# Patient Record
Sex: MALE | Race: WHITE | NOT HISPANIC OR LATINO | Employment: OTHER | URBAN - METROPOLITAN AREA
[De-identification: names, ages, dates, MRNs, and addresses within clinical notes are randomized per-mention and may not be internally consistent; named-entity substitution may affect disease eponyms.]

---

## 2023-03-30 DIAGNOSIS — E11.9 TYPE 2 DIABETES MELLITUS WITHOUT COMPLICATIONS (MULTI): ICD-10-CM

## 2023-04-12 RX ORDER — METFORMIN HYDROCHLORIDE 500 MG/1
TABLET ORAL
Qty: 180 TABLET | Refills: 1 | Status: SHIPPED | OUTPATIENT
Start: 2023-04-12 | End: 2023-05-18 | Stop reason: DRUGHIGH

## 2023-05-11 PROBLEM — G89.29 CHRONIC LEFT SHOULDER PAIN: Status: ACTIVE | Noted: 2023-05-11

## 2023-05-11 PROBLEM — M54.30 SCIATICA: Status: ACTIVE | Noted: 2023-05-11

## 2023-05-11 PROBLEM — M25.512 CHRONIC LEFT SHOULDER PAIN: Status: ACTIVE | Noted: 2023-05-11

## 2023-05-11 PROBLEM — G89.4 CHRONIC PAIN SYNDROME: Status: ACTIVE | Noted: 2023-05-11

## 2023-05-11 PROBLEM — E78.5 HYPERLIPIDEMIA: Status: ACTIVE | Noted: 2023-05-11

## 2023-05-11 PROBLEM — N40.0 BENIGN PROSTATIC HYPERPLASIA: Status: ACTIVE | Noted: 2023-05-11

## 2023-05-11 PROBLEM — I10 HYPERTENSION: Status: ACTIVE | Noted: 2023-05-11

## 2023-05-11 PROBLEM — D17.30 LIPOMA OF SKIN AND SUBCUTANEOUS TISSUE: Status: ACTIVE | Noted: 2023-05-11

## 2023-05-11 PROBLEM — S43.61XA STERNOCLAVICULAR SPRAIN, RIGHT, INITIAL ENCOUNTER: Status: ACTIVE | Noted: 2023-05-11

## 2023-05-11 PROBLEM — M51.26 DISC DISPLACEMENT, LUMBAR: Status: ACTIVE | Noted: 2023-05-11

## 2023-05-11 PROBLEM — E11.9 DIABETES MELLITUS (MULTI): Status: ACTIVE | Noted: 2023-05-11

## 2023-05-11 PROBLEM — G89.29 CHRONIC LEFT-SIDED LOW BACK PAIN WITH LEFT-SIDED SCIATICA: Status: ACTIVE | Noted: 2023-05-11

## 2023-05-11 PROBLEM — G54.2 LESION OF LEFT CERVICAL NERVE ROOT: Status: ACTIVE | Noted: 2023-05-11

## 2023-05-11 PROBLEM — E55.9 VITAMIN D DEFICIENCY DISEASE: Status: ACTIVE | Noted: 2023-05-11

## 2023-05-11 PROBLEM — H69.92 DYSFUNCTION OF LEFT EUSTACHIAN TUBE: Status: ACTIVE | Noted: 2023-05-11

## 2023-05-11 PROBLEM — M54.42 CHRONIC LEFT-SIDED LOW BACK PAIN WITH LEFT-SIDED SCIATICA: Status: ACTIVE | Noted: 2023-05-11

## 2023-05-11 PROBLEM — J30.1 HAY FEVER: Status: ACTIVE | Noted: 2023-05-11

## 2023-05-11 PROBLEM — M54.2 CERVICALGIA: Status: ACTIVE | Noted: 2023-05-11

## 2023-05-11 PROBLEM — M77.10 LATERAL EPICONDYLITIS: Status: ACTIVE | Noted: 2023-05-11

## 2023-05-11 PROBLEM — Z98.890 STATUS POST LUMBAR SPINE SURGERY FOR DECOMPRESSION OF SPINAL CORD: Status: ACTIVE | Noted: 2023-05-11

## 2023-05-11 PROBLEM — J34.2 DEVIATED NASAL SEPTUM: Status: ACTIVE | Noted: 2023-05-11

## 2023-05-11 PROBLEM — M54.12 CERVICAL RADICULOPATHY: Status: ACTIVE | Noted: 2023-05-11

## 2023-05-11 PROBLEM — I80.9 PHLEBITIS, SUPERFICIAL: Status: ACTIVE | Noted: 2023-05-11

## 2023-05-11 PROBLEM — J30.9 ALLERGIC RHINITIS: Status: ACTIVE | Noted: 2023-05-11

## 2023-05-11 RX ORDER — SIMVASTATIN 10 MG/1
10 TABLET, FILM COATED ORAL DAILY
COMMUNITY
End: 2023-06-13 | Stop reason: SDUPTHER

## 2023-05-11 RX ORDER — OMEPRAZOLE 10 MG/1
CAPSULE, DELAYED RELEASE ORAL
COMMUNITY

## 2023-05-11 RX ORDER — GABAPENTIN 100 MG/1
CAPSULE ORAL
COMMUNITY
Start: 2022-11-15 | End: 2024-01-22

## 2023-05-11 RX ORDER — TADALAFIL 20 MG/1
TABLET ORAL
COMMUNITY
Start: 2023-01-18

## 2023-05-11 RX ORDER — GABAPENTIN 300 MG/1
300 CAPSULE ORAL 4 TIMES DAILY
COMMUNITY
End: 2023-07-18 | Stop reason: SDUPTHER

## 2023-05-11 RX ORDER — BLOOD SUGAR DIAGNOSTIC
STRIP MISCELLANEOUS
COMMUNITY
Start: 2013-09-25

## 2023-05-11 RX ORDER — DULOXETIN HYDROCHLORIDE 60 MG/1
60 CAPSULE, DELAYED RELEASE ORAL DAILY
COMMUNITY

## 2023-05-11 RX ORDER — ASPIRIN 81 MG/1
81 TABLET ORAL
COMMUNITY
End: 2024-05-28 | Stop reason: ALTCHOICE

## 2023-05-11 RX ORDER — NALOXONE HYDROCHLORIDE 4 MG/.1ML
SPRAY NASAL
COMMUNITY
Start: 2021-12-21 | End: 2024-03-06 | Stop reason: ALTCHOICE

## 2023-05-11 RX ORDER — ACETAMINOPHEN 500 MG
TABLET ORAL DAILY
COMMUNITY
End: 2023-05-18 | Stop reason: ALTCHOICE

## 2023-05-18 ENCOUNTER — OFFICE VISIT (OUTPATIENT)
Dept: PRIMARY CARE | Facility: CLINIC | Age: 68
End: 2023-05-18
Payer: COMMERCIAL

## 2023-05-18 VITALS
BODY MASS INDEX: 30.23 KG/M2 | SYSTOLIC BLOOD PRESSURE: 126 MMHG | DIASTOLIC BLOOD PRESSURE: 86 MMHG | OXYGEN SATURATION: 97 % | HEART RATE: 86 BPM | WEIGHT: 193 LBS

## 2023-05-18 DIAGNOSIS — I10 PRIMARY HYPERTENSION: Primary | ICD-10-CM

## 2023-05-18 DIAGNOSIS — Z12.5 SCREENING FOR MALIGNANT NEOPLASM OF PROSTATE: ICD-10-CM

## 2023-05-18 DIAGNOSIS — E08.00 DIABETES MELLITUS DUE TO UNDERLYING CONDITION WITH HYPEROSMOLARITY WITHOUT COMA, WITHOUT LONG-TERM CURRENT USE OF INSULIN (MULTI): ICD-10-CM

## 2023-05-18 LAB — POC HEMOGLOBIN A1C: 6.1 % (ref 4.2–6.5)

## 2023-05-18 PROCEDURE — 1159F MED LIST DOCD IN RCRD: CPT | Performed by: FAMILY MEDICINE

## 2023-05-18 PROCEDURE — 3079F DIAST BP 80-89 MM HG: CPT | Performed by: FAMILY MEDICINE

## 2023-05-18 PROCEDURE — 1125F AMNT PAIN NOTED PAIN PRSNT: CPT | Performed by: FAMILY MEDICINE

## 2023-05-18 PROCEDURE — 3074F SYST BP LT 130 MM HG: CPT | Performed by: FAMILY MEDICINE

## 2023-05-18 PROCEDURE — 83036 HEMOGLOBIN GLYCOSYLATED A1C: CPT | Performed by: FAMILY MEDICINE

## 2023-05-18 PROCEDURE — 99213 OFFICE O/P EST LOW 20 MIN: CPT | Performed by: FAMILY MEDICINE

## 2023-05-18 PROCEDURE — 1036F TOBACCO NON-USER: CPT | Performed by: FAMILY MEDICINE

## 2023-05-18 PROCEDURE — 3044F HG A1C LEVEL LT 7.0%: CPT | Performed by: FAMILY MEDICINE

## 2023-05-18 ASSESSMENT — PATIENT HEALTH QUESTIONNAIRE - PHQ9
2. FEELING DOWN, DEPRESSED OR HOPELESS: NOT AT ALL
1. LITTLE INTEREST OR PLEASURE IN DOING THINGS: NOT AT ALL
SUM OF ALL RESPONSES TO PHQ9 QUESTIONS 1 AND 2: 0

## 2023-05-18 NOTE — PATIENT INSTRUCTIONS
A1c 6.1% today with looks great   Continue the metformin 1000 mg twice per day with meals.  Continue the new dietary changes.     Colon screening in August 2, 2023. we will look for the results.     Labs due in the November 2023, order given today .     follow up with urologist as recommended     Continue the cholesterol medications.     Blood pressure looks good. 126/86    call if you need refills.     follow up in November 2023 (to review labs and recheck A1c) or sooner as needed.

## 2023-05-18 NOTE — PROGRESS NOTES
Subjective   Casey Raya is a 68 y.o. male who presents for Follow-up (Follow up on A1c).    HPI  Casey is a pleasant 68 yr here for follow up   Accompanied by wife.     1. Chronic neck and Back Pain- would like a MRI on 3/31/22- no major changes to the lumbar spine, still have disease and would recommend seeing an orthopedic spine doc to review and discuss with your sympotms. L4/L5: Left hemilaminotomy (post surgery changes) . Decreased left paracentral protrusion and perineural fibrosis (this is a good thing) . L5/S1: Moderate left foraminal stenosis.  - tried an epidural nerve block which didn’t help   - previous s/p L4/L5 back surgery performed by Vignesh Kevin  - [facility] rec trial of PT in April 2022  - increased gabapentin by 200 mg (1400 per day)     #) trigger finger, - release by Dr Newton in 2022    2. 3 mo A1C follow up-- 6.3% today (up from 6.2%) --> 7.4% --> 6.1%   - on metformin 500 mg BID --> will increase to 100 mg BID   - was on statin, and stopped because was worried it was making sugars are worse     2. Pain Management  - not following with them any longer   - Gabapentin - is helping, cymbalta  - wants some Vicodin for his back until he can see Dr. Kevin    3. Shoulder Pain- the same  - Dr. High had an appointment and he said that there is nothing wrong with the shoulder structurally according to the patient    4. BPH with LUTS - follows with urology  - on gemtesa (stopped the myrbetric)   resolved s/p Urolift 2019 and PVP 11/08/2021      ROS was completed and all systems are negative with the exception of what was noted in the the HPI.     Objective     /86   Pulse 86   Wt 87.5 kg (193 lb)   SpO2 97%   BMI 30.23 kg/m²      Physical Exam  Gen: A+O, NAD  HEENT: NC/AT, EOMI, no LAD, oropharynx clear, no edema or erythema, TM visualized and normal  CV: RRR, No M/R/G  Resp: CTAB, No W/R/C  Abd: Soft, NT/ND  Neuro: PERRL, moves all extremities equally, normal gait   Skin: No  rashes, no LE edema or varicosities   MSK: Grossly intact strength and reflexes; normal gait  Psych: Normal mood and affect    Assessment/Plan   Problem List Items Addressed This Visit    None    Labs due in the Fall 2023    A1c was higher today at 7.1% , please increase the metformin to 1000 mg twice per day.     follow up with urologist.     restart the cholesterol medications.     For the constipation, try miralax or colace over the counter,   Also make sure to get enough fiber in your diet (this is food for the good bacteria). you could also try metamucil or other fiber supplement.   Shoot for roughly 35 grams of fiber per day.   Things high in fiber: fruits with skin on, nuts, seeds, berries, oatmeal, quinoa, and high fiber breads and cereals.    Blood pressure looks good. 130/78    call if you need refills.     follow up in 6 months or sooner as needed.        Ruthie Renee DO, MSMed, ABOM  [address]   [address]   [place] [address]  Ph. [phone]  Fx. [phone]

## 2023-06-13 ENCOUNTER — TELEPHONE (OUTPATIENT)
Dept: PRIMARY CARE | Facility: CLINIC | Age: 68
End: 2023-06-13
Payer: COMMERCIAL

## 2023-06-13 DIAGNOSIS — E78.5 HYPERLIPIDEMIA, UNSPECIFIED HYPERLIPIDEMIA TYPE: ICD-10-CM

## 2023-06-13 RX ORDER — SIMVASTATIN 10 MG/1
10 TABLET, FILM COATED ORAL DAILY
Qty: 90 TABLET | Refills: 3 | Status: SHIPPED | OUTPATIENT
Start: 2023-06-13 | End: 2024-04-03

## 2023-06-13 NOTE — TELEPHONE ENCOUNTER
[facility] called for a medication refill request for  Simvastatin  To go to the [facility] in [place].     LOV-05/18/23 NOV-11/02/23

## 2023-07-18 DIAGNOSIS — G89.4 CHRONIC PAIN SYNDROME: ICD-10-CM

## 2023-07-18 RX ORDER — GABAPENTIN 300 MG/1
300 CAPSULE ORAL 4 TIMES DAILY
Qty: 360 CAPSULE | Refills: 1 | Status: CANCELLED | OUTPATIENT
Start: 2023-07-18

## 2023-07-19 RX ORDER — GABAPENTIN 300 MG/1
300 CAPSULE ORAL 4 TIMES DAILY
Qty: 360 CAPSULE | Refills: 1 | Status: SHIPPED | OUTPATIENT
Start: 2023-07-19 | End: 2024-01-19

## 2023-08-23 LAB — HEMOGLOBIN A1C/HEMOGLOBIN TOTAL IN BLOOD EXTERNAL: 6.1 %

## 2023-11-01 ASSESSMENT — ENCOUNTER SYMPTOMS
FATIGUE: 0
POLYPHAGIA: 0
DIZZINESS: 0
BLURRED VISION: 0
HUNGER: 0
SPEECH DIFFICULTY: 0
HEADACHES: 0
NERVOUS/ANXIOUS: 0
TREMORS: 0
POLYDIPSIA: 0
SEIZURES: 0
BLACKOUTS: 0
WEIGHT LOSS: 0
VISUAL CHANGE: 0
WEAKNESS: 0
CONFUSION: 0
SWEATS: 0

## 2023-11-02 ENCOUNTER — OFFICE VISIT (OUTPATIENT)
Dept: PRIMARY CARE | Facility: CLINIC | Age: 68
End: 2023-11-02
Payer: COMMERCIAL

## 2023-11-02 VITALS
HEIGHT: 67 IN | RESPIRATION RATE: 18 BRPM | WEIGHT: 196 LBS | BODY MASS INDEX: 30.76 KG/M2 | DIASTOLIC BLOOD PRESSURE: 72 MMHG | SYSTOLIC BLOOD PRESSURE: 124 MMHG | OXYGEN SATURATION: 95 % | HEART RATE: 73 BPM

## 2023-11-02 DIAGNOSIS — E55.9 AVITAMINOSIS D: Primary | ICD-10-CM

## 2023-11-02 DIAGNOSIS — E08.00 DIABETES MELLITUS DUE TO UNDERLYING CONDITION WITH HYPEROSMOLARITY WITHOUT COMA, WITHOUT LONG-TERM CURRENT USE OF INSULIN (MULTI): ICD-10-CM

## 2023-11-02 DIAGNOSIS — Z12.5 SCREENING FOR MALIGNANT NEOPLASM OF PROSTATE: ICD-10-CM

## 2023-11-02 LAB — POC HEMOGLOBIN A1C: 7.2 % (ref 4.2–6.5)

## 2023-11-02 PROCEDURE — 3074F SYST BP LT 130 MM HG: CPT | Performed by: FAMILY MEDICINE

## 2023-11-02 PROCEDURE — 83036 HEMOGLOBIN GLYCOSYLATED A1C: CPT | Performed by: FAMILY MEDICINE

## 2023-11-02 PROCEDURE — 3051F HG A1C>EQUAL 7.0%<8.0%: CPT | Performed by: FAMILY MEDICINE

## 2023-11-02 PROCEDURE — 99214 OFFICE O/P EST MOD 30 MIN: CPT | Performed by: FAMILY MEDICINE

## 2023-11-02 PROCEDURE — 3078F DIAST BP <80 MM HG: CPT | Performed by: FAMILY MEDICINE

## 2023-11-02 PROCEDURE — 1125F AMNT PAIN NOTED PAIN PRSNT: CPT | Performed by: FAMILY MEDICINE

## 2023-11-02 PROCEDURE — 1036F TOBACCO NON-USER: CPT | Performed by: FAMILY MEDICINE

## 2023-11-02 PROCEDURE — 1159F MED LIST DOCD IN RCRD: CPT | Performed by: FAMILY MEDICINE

## 2023-11-02 PROCEDURE — 3044F HG A1C LEVEL LT 7.0%: CPT | Performed by: FAMILY MEDICINE

## 2023-11-02 RX ORDER — DAPAGLIFLOZIN 10 MG/1
10 TABLET, FILM COATED ORAL DAILY
Qty: 90 TABLET | Refills: 3 | Status: SHIPPED | OUTPATIENT
Start: 2023-11-02 | End: 2024-03-06 | Stop reason: ALTCHOICE

## 2023-11-02 RX ORDER — LORAZEPAM 0.5 MG/1
TABLET ORAL
COMMUNITY

## 2023-11-02 ASSESSMENT — ENCOUNTER SYMPTOMS
WEIGHT LOSS: 0
VISUAL CHANGE: 0
WEAKNESS: 0
HEADACHES: 0
BLURRED VISION: 0
DIZZINESS: 0
NERVOUS/ANXIOUS: 0
POLYPHAGIA: 0
SEIZURES: 0
SWEATS: 0
BLACKOUTS: 0
FATIGUE: 0
CONFUSION: 0
POLYDIPSIA: 0
SPEECH DIFFICULTY: 0
HUNGER: 0
TREMORS: 0

## 2023-11-02 NOTE — PATIENT INSTRUCTIONS
A1c was a little high today on 7.2%.   Continue the metformin 1000 mg twice per day with meals.    Please start the farxiga once daily for better sugar control and to protect the heart     Colon screening in August 2, 2023. Repeat colonoscopy in 10 years for screening purposes.     Labs due in the November 2023, order given today .     follow up with urologist as recommended     Continue the cholesterol medications.     Blood pressure looks good. 124/72    Please get an up dated eye and dental examination     Check your feet daily.     call if you need refills.     follow up in 90 days for a repeat A1c check or sooner as needed.

## 2023-11-02 NOTE — PROGRESS NOTES
"Subjective   Casey Raya is a 68 y.o. male who presents for Follow-up (Patient in today for routine F/U and A1C check. Reports no other concerns at this time. Stated his A1C may be a \"little elevated\" has not been able to walk for exercise as normal due to increased neck discomfort. ).  Accompanied by his wife.     Diabetes  He has type 2 diabetes mellitus. No MedicAlert identification noted. Pertinent negatives for hypoglycemia include no confusion, dizziness, headaches, hunger, mood changes, nervousness/anxiousness, pallor, seizures, sleepiness, speech difficulty, sweats or tremors. Associated symptoms include foot paresthesias. Pertinent negatives for diabetes include no blurred vision, no chest pain, no fatigue, no foot ulcerations, no polydipsia, no polyphagia, no polyuria, no visual change, no weakness and no weight loss. Pertinent negatives for hypoglycemia complications include no blackouts, no hospitalization, no nocturnal hypoglycemia, no required assistance and no required glucagon injection. Symptoms are stable. Diabetic complications include impotence. Pertinent negatives for diabetic complications include no CVA, heart disease, nephropathy, peripheral neuropathy, PVD or retinopathy. Risk factors for coronary artery disease include dyslipidemia, family history and obesity. Current diabetic treatment includes diet and oral agent (monotherapy). He is compliant with treatment most of the time. His weight is fluctuating minimally. He is following a diabetic diet. Meal planning includes carbohydrate counting. He has not had a previous visit with a dietitian. He participates in exercise three times a week. He monitors blood glucose at home 1-2 x per day. Blood glucose monitoring compliance is fair. There is no change in his home blood glucose trend. His breakfast blood glucose is taken after 10 am. His breakfast blood glucose range is generally 110-130 mg/dl. His lunch blood glucose is taken between " "11-12 pm. His lunch blood glucose range is generally 130-140 mg/dl. His dinner blood glucose is taken between 7-8 pm. His dinner blood glucose range is generally 140-180 mg/dl. His overall blood glucose range is 140-180 mg/dl. He does not see a podiatrist.Eye exam is current.     1. Chronic neck and Back Pain- would like a MRI of the L spine 3/31/22- no major changes to the lumbar spine, still have disease and would recommend seeing an orthopedic spine doc to review and discuss with your sympotms. L4/L5: Left hemilaminotomy (post surgery changes) . Decreased left paracentral protrusion and perineural fibrosis (this is a good thing) . L5/S1: Moderate left foraminal stenosis.  - tried an epidural nerve block which didn’t help   - previous s/p L4/L5 back surgery performed by Vignesh Kevin  - [facility] rec trial of PT in April 2022  - increased gabapentin by 200 mg (1400 per day)     #) trigger finger, - release by Dr Newton in 2022    #) T2DM    3 mo A1C follow up-- 6.3% today (up from 6.2%) --> 7.4% --> 6.1% --> 7.2%   - was on a cruise last month to [place] so was eating a lot   - on metformin 1000 mg BID   - was on statin, and stopped because was worried it was making sugars are worse     #)  Pain Management- is better with less activity  - not following with them any longer   - Gabapentin - is helping, cymbalta  - wants some Vicodin for his back until he can see Dr. Kevin    3. Shoulder Pain- the same/ \"ok\"   - Dr. High had an appointment and he said that there is nothing wrong with the shoulder structurally according to the patient    4. BPH with LUTS - follows with urology yearly.   - on gemtesa (stopped the myrbetric)   resolved s/p Urolift 2019 and PVP 11/08/2021      ROS was completed and all systems are negative with the exception of what was noted in the the HPI.     Objective     /72   Pulse 73   Resp 18   Ht 1.702 m (5' 7\")   Wt 88.9 kg (196 lb)   SpO2 95%   BMI 30.70 kg/m²      Physical " Exam  Gen: A+O, NAD  HEENT: NC/AT, EOMI, no LAD, oropharynx clear, no edema or erythema, TM visualized and normal  CV: RRR, No M/R/G  Resp: CTAB, No W/R/C  Abd: Soft, NT/ND  Neuro: PERRL, moves all extremities equally, normal gait   Skin: No rashes, no LE edema or varicosities   MSK: Grossly intact strength and reflexes; normal gait  Psych: Normal mood and affect    Assessment/Plan   Problem List Items Addressed This Visit       Diabetes mellitus (CMS/[facility])    Relevant Orders    POCT glycosylated hemoglobin (Hb A1C) manually resulted     A1c was a little high today on 7.2%.   Continue the metformin 1000 mg twice per day with meals.    Please start the farxiga once daily for better sugar control and to protect the heart     Colon screening in August 2, 2023. Repeat colonoscopy in 10 years for screening purposes.     Labs due in the November 2023, order given today .     follow up with urologist as recommended     Continue the cholesterol medications.     Blood pressure looks good. 124/72    Please get an up dated eye and dental examination     Check your feet daily.     call if you need refills.     follow up in 90 days for a repeat A1c check or sooner as needed.     Ruthie Renee DO, MSMed, ABOM  [address]   [address]   [place] [address]  Ph. [phone]  Fx. [phone]

## 2023-11-08 LAB — HEMOGLOBIN A1C/HEMOGLOBIN TOTAL IN BLOOD EXTERNAL: 7.2 %

## 2024-01-18 DIAGNOSIS — M54.16 RADICULOPATHY, LUMBAR REGION: ICD-10-CM

## 2024-01-18 DIAGNOSIS — G89.4 CHRONIC PAIN SYNDROME: ICD-10-CM

## 2024-01-19 DIAGNOSIS — G89.4 CHRONIC PAIN SYNDROME: Primary | ICD-10-CM

## 2024-01-19 RX ORDER — GABAPENTIN 300 MG/1
600 CAPSULE ORAL 3 TIMES DAILY
Qty: 540 CAPSULE | Refills: 0 | Status: SHIPPED | OUTPATIENT
Start: 2024-01-19 | End: 2024-05-23

## 2024-01-19 RX ORDER — GABAPENTIN 100 MG/1
CAPSULE ORAL
Qty: 90 CAPSULE | Refills: 2 | OUTPATIENT
Start: 2024-01-19

## 2024-01-22 RX ORDER — GABAPENTIN 100 MG/1
CAPSULE ORAL
Qty: 90 CAPSULE | Refills: 2 | Status: SHIPPED | OUTPATIENT
Start: 2024-01-22 | End: 2024-05-23 | Stop reason: WASHOUT

## 2024-01-28 ASSESSMENT — ENCOUNTER SYMPTOMS
FATIGUE: 0
WEIGHT LOSS: 0
TREMORS: 0
POLYDIPSIA: 0
SEIZURES: 0
SPEECH DIFFICULTY: 0
VISUAL CHANGE: 0
WEAKNESS: 0
BLURRED VISION: 0
NERVOUS/ANXIOUS: 0
POLYPHAGIA: 0
DIZZINESS: 0
HEADACHES: 0
SWEATS: 0
CONFUSION: 0
HUNGER: 0
BLACKOUTS: 0

## 2024-02-01 ENCOUNTER — OFFICE VISIT (OUTPATIENT)
Dept: PRIMARY CARE | Facility: CLINIC | Age: 69
End: 2024-02-01
Payer: COMMERCIAL

## 2024-02-01 VITALS
OXYGEN SATURATION: 97 % | HEART RATE: 70 BPM | WEIGHT: 190 LBS | SYSTOLIC BLOOD PRESSURE: 134 MMHG | DIASTOLIC BLOOD PRESSURE: 86 MMHG | BODY MASS INDEX: 29.76 KG/M2

## 2024-02-01 DIAGNOSIS — E08.00 DIABETES MELLITUS DUE TO UNDERLYING CONDITION WITH HYPEROSMOLARITY WITHOUT COMA, WITHOUT LONG-TERM CURRENT USE OF INSULIN (MULTI): Primary | ICD-10-CM

## 2024-02-01 PROCEDURE — 1125F AMNT PAIN NOTED PAIN PRSNT: CPT | Performed by: FAMILY MEDICINE

## 2024-02-01 PROCEDURE — 99214 OFFICE O/P EST MOD 30 MIN: CPT | Performed by: FAMILY MEDICINE

## 2024-02-01 PROCEDURE — 1036F TOBACCO NON-USER: CPT | Performed by: FAMILY MEDICINE

## 2024-02-01 PROCEDURE — 3079F DIAST BP 80-89 MM HG: CPT | Performed by: FAMILY MEDICINE

## 2024-02-01 PROCEDURE — 3075F SYST BP GE 130 - 139MM HG: CPT | Performed by: FAMILY MEDICINE

## 2024-02-01 PROCEDURE — 1159F MED LIST DOCD IN RCRD: CPT | Performed by: FAMILY MEDICINE

## 2024-02-01 ASSESSMENT — ENCOUNTER SYMPTOMS
WEAKNESS: 0
HEADACHES: 0
BLURRED VISION: 0
POLYDIPSIA: 0
HUNGER: 0
SEIZURES: 0
CONFUSION: 0
BLACKOUTS: 0
TREMORS: 0
NERVOUS/ANXIOUS: 0
POLYPHAGIA: 0
DIZZINESS: 0
SPEECH DIFFICULTY: 0
WEIGHT LOSS: 0
FATIGUE: 0
VISUAL CHANGE: 0
SWEATS: 0

## 2024-02-01 ASSESSMENT — PATIENT HEALTH QUESTIONNAIRE - PHQ9
1. LITTLE INTEREST OR PLEASURE IN DOING THINGS: NOT AT ALL
2. FEELING DOWN, DEPRESSED OR HOPELESS: NOT AT ALL
SUM OF ALL RESPONSES TO PHQ9 QUESTIONS 1 AND 2: 0

## 2024-02-01 NOTE — PROGRESS NOTES
Subjective   Casey Raya is a 68 y.o. male who presents for Follow-up (3 month follow up).  Accompanied by his wife.     Diabetes  He has type 2 diabetes mellitus. No MedicAlert identification noted. The initial diagnosis of diabetes was made 10 years ago. Pertinent negatives for hypoglycemia include no confusion, dizziness, headaches, hunger, mood changes, nervousness/anxiousness, pallor, seizures, sleepiness, speech difficulty, sweats or tremors. Associated symptoms include foot paresthesias. Pertinent negatives for diabetes include no blurred vision, no chest pain, no fatigue, no foot ulcerations, no polydipsia, no polyphagia, no polyuria, no visual change, no weakness and no weight loss. Pertinent negatives for hypoglycemia complications include no blackouts, no hospitalization, no nocturnal hypoglycemia, no required assistance and no required glucagon injection. Symptoms are stable. Diabetic complications include impotence. Pertinent negatives for diabetic complications include no CVA, heart disease, nephropathy, peripheral neuropathy, PVD or retinopathy. Risk factors for coronary artery disease include dyslipidemia, family history and obesity. Current diabetic treatment includes diet and oral agent (monotherapy). He is compliant with treatment most of the time. His weight is fluctuating minimally. He is following a diabetic diet. Meal planning includes carbohydrate counting. He has not had a previous visit with a dietitian. He participates in exercise three times a week. He monitors blood glucose at home 1-2 x per day. Blood glucose monitoring compliance is fair. There is no change in his home blood glucose trend. His breakfast blood glucose is taken after 10 am. His breakfast blood glucose range is generally 110-130 mg/dl. His lunch blood glucose is taken between 11-12 pm. His lunch blood glucose range is generally 130-140 mg/dl. His dinner blood glucose is taken between 7-8 pm. His dinner blood glucose  "range is generally 140-180 mg/dl. His overall blood glucose range is 140-180 mg/dl. He does not see a podiatrist.Eye exam is current.     1. Chronic neck and Back Pain-   - would like a MRI of the L spine 3/31/22- no major changes to the lumbar spine, still have disease and would recommend seeing an orthopedic spine doc to review and discuss with your sympotms. L4/L5: Left hemilaminotomy (post surgery changes) . Decreased left paracentral protrusion and perineural fibrosis (this is a good thing) . L5/S1: Moderate left foraminal stenosis.  - tried an epidural nerve block which didn’t help   - previous s/p L4/L5 back surgery performed by Vignesh Kevin  - [facility] rec trial of PT in April 2022  - increased gabapentin by 200 mg (1400 per day)     #) trigger finger, - release by Dr Newton in 2022    #) T2DM    3 mo A1C follow up-- 6.3% today (up from 6.2%) --> 7.4% --> 6.1% --> 7.2%   - was on a cruise last month to [place] so was eating a lot   - had to stop the Farxiga since her was already on Gemtesa for increased urinary urgency/frequency , since the farxiga increase these   - on metformin 1000 mg BID   - was on statin, and stopped because was worried it was making sugars are worse     #)  Pain Management- is better with less activity  - not following with them any longer   - Gabapentin - is helping, cymbalta  - wants some Vicodin for his back until he can see Dr. Kevin    3. Shoulder Pain- the same/ \"ok\"   - Dr. High had an appointment and he said that there is nothing wrong with the shoulder structurally according to the patient    4. BPH with LUTS - follows with urology yearly.   - on gemtesa (stopped the myrbetric)   resolved s/p Urolift 2019 and PVP 11/08/2021   - could not tolerate the SGLT2 due to worsening urinary symptoms      ROS was completed and all systems are negative with the exception of what was noted in the the HPI.     Objective     /86   Pulse 70   Wt 86.2 kg (190 lb)   SpO2 97%   BMI " 29.76 kg/m²      Physical Exam  Gen: A+O, NAD  HEENT: NC/AT, EOMI, no LAD, oropharynx clear, no edema or erythema, TM visualized and normal  CV: RRR, No M/R/G  Resp: CTAB, No W/R/C  Abd: Soft, NT/ND  Neuro: PERRL, moves all extremities equally, normal gait   Skin: No rashes, no LE edema or varicosities   MSK: Grossly intact strength and reflexes; normal gait  Psych: Normal mood and affect    Assessment/Plan   Problem List Items Addressed This Visit    None    A1c was a little high last visit at 7.2%. ONE DAY too early to check again.   Order for blood work to check tomorrow or after.     Continue the metformin 1000 mg twice per day with meals.    Stop the Farxiga, we might want to start the ozempic next visit.     Colon screening in August 2, 2023. Repeat colonoscopy in 10 years for screening purposes.     Labs due in the November 2023, order given last visit with an updated A1c     follow up with urologist as recommended     Continue the cholesterol medications.     Blood pressure looks good 134/86.     You are down 6# which is good with the new diet of lower carbs and increase fruits/veggies     Please get an up dated eye and dental examination   Also check feet regularly.     call if you need refills.     follow up in 4 months for a repeat A1c check or sooner as needed.     Ruthie Renee DO, MSMed, ABOM  [address]   [address]   [place] [address]  Ph. [phone]  Fx. [phone]

## 2024-02-01 NOTE — PATIENT INSTRUCTIONS
A1c was a little high last visit at 7.2%. ONE DAY too early to check again.   Order for blood work to check tomorrow or after.     Continue the metformin 1000 mg twice per day with meals.    Stop the Farxiga, we might want to start the ozempic next visit.     Colon screening in August 2, 2023. Repeat colonoscopy in 10 years for screening purposes.     Labs due in the November 2023, order given last visit with an updated A1c     follow up with urologist as recommended     Continue the cholesterol medications.     Blood pressure looks good 134/86.     You are down 6# which is good with the new diet of lower carbs and increase fruits/veggies     Please get an up dated eye and dental examination   Also check feet regularly.     call if you need refills.     follow up in 4 months for a repeat A1c check or sooner as needed.

## 2024-02-02 ENCOUNTER — LAB (OUTPATIENT)
Dept: LAB | Facility: LAB | Age: 69
End: 2024-02-02
Payer: COMMERCIAL

## 2024-02-02 DIAGNOSIS — Z12.5 SCREENING FOR MALIGNANT NEOPLASM OF PROSTATE: ICD-10-CM

## 2024-02-02 DIAGNOSIS — E08.00 DIABETES MELLITUS DUE TO UNDERLYING CONDITION WITH HYPEROSMOLARITY WITHOUT COMA, WITHOUT LONG-TERM CURRENT USE OF INSULIN (MULTI): ICD-10-CM

## 2024-02-02 LAB
ALBUMIN SERPL BCP-MCNC: 4.3 G/DL (ref 3.4–5)
ALP SERPL-CCNC: 55 U/L (ref 33–136)
ALT SERPL W P-5'-P-CCNC: 49 U/L (ref 10–52)
ANION GAP SERPL CALC-SCNC: 8 MMOL/L (ref 10–20)
AST SERPL W P-5'-P-CCNC: 35 U/L (ref 9–39)
BASOPHILS # BLD AUTO: 0.04 X10*3/UL (ref 0–0.1)
BASOPHILS NFR BLD AUTO: 0.6 %
BILIRUB SERPL-MCNC: 0.6 MG/DL (ref 0–1.2)
BUN SERPL-MCNC: 15 MG/DL (ref 6–23)
CALCIUM SERPL-MCNC: 9.5 MG/DL (ref 8.6–10.6)
CHLORIDE SERPL-SCNC: 102 MMOL/L (ref 98–107)
CHOLEST SERPL-MCNC: 159 MG/DL (ref 0–199)
CHOLESTEROL/HDL RATIO: 4.8
CO2 SERPL-SCNC: 31 MMOL/L (ref 21–32)
CREAT SERPL-MCNC: 0.59 MG/DL (ref 0.5–1.3)
CREAT UR-MCNC: 62.9 MG/DL (ref 20–370)
EGFRCR SERPLBLD CKD-EPI 2021: >90 ML/MIN/1.73M*2
EOSINOPHIL # BLD AUTO: 0.44 X10*3/UL (ref 0–0.7)
EOSINOPHIL NFR BLD AUTO: 6.3 %
ERYTHROCYTE [DISTWIDTH] IN BLOOD BY AUTOMATED COUNT: 11.9 % (ref 11.5–14.5)
EST. AVERAGE GLUCOSE BLD GHB EST-MCNC: 166 MG/DL
GLUCOSE SERPL-MCNC: 154 MG/DL (ref 74–99)
HBA1C MFR BLD: 7.4 %
HCT VFR BLD AUTO: 42.5 % (ref 41–52)
HDLC SERPL-MCNC: 33.1 MG/DL
HGB BLD-MCNC: 14.3 G/DL (ref 13.5–17.5)
IMM GRANULOCYTES # BLD AUTO: 0.02 X10*3/UL (ref 0–0.7)
IMM GRANULOCYTES NFR BLD AUTO: 0.3 % (ref 0–0.9)
LDLC SERPL CALC-MCNC: 75 MG/DL
LYMPHOCYTES # BLD AUTO: 2.79 X10*3/UL (ref 1.2–4.8)
LYMPHOCYTES NFR BLD AUTO: 40.1 %
MCH RBC QN AUTO: 31 PG (ref 26–34)
MCHC RBC AUTO-ENTMCNC: 33.6 G/DL (ref 32–36)
MCV RBC AUTO: 92 FL (ref 80–100)
MICROALBUMIN UR-MCNC: <7 MG/L
MICROALBUMIN/CREAT UR: NORMAL MG/G{CREAT}
MONOCYTES # BLD AUTO: 0.63 X10*3/UL (ref 0.1–1)
MONOCYTES NFR BLD AUTO: 9.1 %
NEUTROPHILS # BLD AUTO: 3.04 X10*3/UL (ref 1.2–7.7)
NEUTROPHILS NFR BLD AUTO: 43.6 %
NON HDL CHOLESTEROL: 126 MG/DL (ref 0–149)
NRBC BLD-RTO: 0 /100 WBCS (ref 0–0)
PLATELET # BLD AUTO: 228 X10*3/UL (ref 150–450)
POTASSIUM SERPL-SCNC: 4.3 MMOL/L (ref 3.5–5.3)
PROT SERPL-MCNC: 7.2 G/DL (ref 6.4–8.2)
PSA SERPL-MCNC: 0.83 NG/ML
RBC # BLD AUTO: 4.61 X10*6/UL (ref 4.5–5.9)
SODIUM SERPL-SCNC: 137 MMOL/L (ref 136–145)
TRIGL SERPL-MCNC: 257 MG/DL (ref 0–149)
VIT B12 SERPL-MCNC: 714 PG/ML (ref 211–911)
VLDL: 51 MG/DL (ref 0–40)
WBC # BLD AUTO: 7 X10*3/UL (ref 4.4–11.3)

## 2024-02-02 PROCEDURE — 82043 UR ALBUMIN QUANTITATIVE: CPT

## 2024-02-02 PROCEDURE — 80061 LIPID PANEL: CPT

## 2024-02-02 PROCEDURE — 82607 VITAMIN B-12: CPT

## 2024-02-02 PROCEDURE — 83036 HEMOGLOBIN GLYCOSYLATED A1C: CPT

## 2024-02-02 PROCEDURE — 82570 ASSAY OF URINE CREATININE: CPT

## 2024-02-02 PROCEDURE — G0103 PSA SCREENING: HCPCS

## 2024-02-02 PROCEDURE — 85025 COMPLETE CBC W/AUTO DIFF WBC: CPT

## 2024-02-02 PROCEDURE — 36415 COLL VENOUS BLD VENIPUNCTURE: CPT

## 2024-02-02 PROCEDURE — 80053 COMPREHEN METABOLIC PANEL: CPT

## 2024-02-08 ENCOUNTER — TELEPHONE (OUTPATIENT)
Dept: PRIMARY CARE | Facility: CLINIC | Age: 69
End: 2024-02-08
Payer: COMMERCIAL

## 2024-02-09 DIAGNOSIS — E08.00 DIABETES MELLITUS DUE TO UNDERLYING CONDITION WITH HYPEROSMOLARITY WITHOUT COMA, WITHOUT LONG-TERM CURRENT USE OF INSULIN (MULTI): Primary | ICD-10-CM

## 2024-02-09 RX ORDER — FLASH GLUCOSE SENSOR
KIT MISCELLANEOUS
Qty: 1 EACH | Refills: 0 | Status: SHIPPED | OUTPATIENT
Start: 2024-02-09 | End: 2024-02-28 | Stop reason: SDUPTHER

## 2024-02-20 DIAGNOSIS — E08.00 DIABETES MELLITUS DUE TO UNDERLYING CONDITION WITH HYPEROSMOLARITY WITHOUT COMA, WITHOUT LONG-TERM CURRENT USE OF INSULIN (MULTI): Primary | ICD-10-CM

## 2024-02-28 DIAGNOSIS — E08.00 DIABETES MELLITUS DUE TO UNDERLYING CONDITION WITH HYPEROSMOLARITY WITHOUT COMA, WITHOUT LONG-TERM CURRENT USE OF INSULIN (MULTI): ICD-10-CM

## 2024-02-29 RX ORDER — FLASH GLUCOSE SENSOR
KIT MISCELLANEOUS
Qty: 6 EACH | Refills: 3 | Status: SHIPPED | OUTPATIENT
Start: 2024-02-29 | End: 2024-04-03 | Stop reason: ALTCHOICE

## 2024-03-01 ENCOUNTER — OFFICE VISIT (OUTPATIENT)
Dept: UROLOGY | Facility: CLINIC | Age: 69
End: 2024-03-01
Payer: COMMERCIAL

## 2024-03-01 VITALS — BODY MASS INDEX: 28.34 KG/M2 | HEIGHT: 68 IN | WEIGHT: 187 LBS

## 2024-03-01 DIAGNOSIS — N40.1 BENIGN PROSTATIC HYPERPLASIA WITH URINARY FREQUENCY: Primary | ICD-10-CM

## 2024-03-01 DIAGNOSIS — N32.81 OAB (OVERACTIVE BLADDER): ICD-10-CM

## 2024-03-01 DIAGNOSIS — R35.0 BENIGN PROSTATIC HYPERPLASIA WITH URINARY FREQUENCY: Primary | ICD-10-CM

## 2024-03-01 PROCEDURE — 3048F LDL-C <100 MG/DL: CPT | Performed by: UROLOGY

## 2024-03-01 PROCEDURE — 3062F POS MACROALBUMINURIA REV: CPT | Performed by: UROLOGY

## 2024-03-01 PROCEDURE — 51798 US URINE CAPACITY MEASURE: CPT | Performed by: UROLOGY

## 2024-03-01 PROCEDURE — 1125F AMNT PAIN NOTED PAIN PRSNT: CPT | Performed by: UROLOGY

## 2024-03-01 PROCEDURE — 1159F MED LIST DOCD IN RCRD: CPT | Performed by: UROLOGY

## 2024-03-01 PROCEDURE — 1036F TOBACCO NON-USER: CPT | Performed by: UROLOGY

## 2024-03-01 PROCEDURE — 99214 OFFICE O/P EST MOD 30 MIN: CPT | Performed by: UROLOGY

## 2024-03-01 PROCEDURE — 3051F HG A1C>EQUAL 7.0%<8.0%: CPT | Performed by: UROLOGY

## 2024-03-01 NOTE — PROGRESS NOTES
SUBJECTIVE: HPI   Patient is a 68 y.o., male with history of BPH s/p Urolift 2019 and PVP (11/2021). Post-operatively reported resolution of his obstructive voiding symptoms. However, complained of persistent frequency, urgency and post-void dribbling. Initially trialed course of Myrbetriq, however changed to Gemtesa 2/2 side-effects. Reports a good response to Gemtesa. Continues Cialis for his ED.      TODAY (03/01/24)  Presents today for an annual follow up visit. Within the last year, was started on a new diabetes medication. States he developed worsened frequency 2/2 this. Since changing to Ozempic, his urinary symptoms have significantly improved. Presently, c/o occasional intermittent stream and frequency but overall not very bothersome. Denies any recent infections, gross hematuria, fevers or chills.   PVR was not residual.   IPSS 15, 2.     Past medical, surgical, family and social history in the chart was reviewed and is accurate including any additions to what is in this HPI.    Review of Systems   Constitutional: denies any unintentional weight loss or change in strength.  Integumentary: denies any rashes or pruritus.  Eyes: denies any double vision or eye pain.  Ear/Nose/Mouth/Throat: denies any nosebleeds or gum bleeds.  Cardiovascular: denies any chest pain or syncope.  Respiratory: denies hemoptysis.  Gastrointestinal: denies nausea or vomiting.  Musculoskeletal: denies muscle cramping or weakness.  Neurologic: denies convulsions or seizures.  Hematologic/Lymphatic: denies bleeding tendencies.  Endocrine: denies heat/cold intolerance.  All other systems have been reviewed and are negative unless otherwise noted in the HPI.    OBJECTIVE:  Physical Exam   Constitutional: NAD  HEENT: AT/NC  Resp: Non labored respirations.  Skin: No jaundice or visible skin lesions.  Neuro: No focal deficits.  Psych: Appropriate mood and affect.    Labs & Imaging:  Lab Results   Component Value Date    WBC 7.0  02/02/2024    HGB 14.3 02/02/2024    HCT 42.5 02/02/2024     02/02/2024    CHOL 159 02/02/2024    TRIG 257 (H) 02/02/2024    HDL 33.1 02/02/2024    ALT 49 02/02/2024    AST 35 02/02/2024     02/02/2024    K 4.3 02/02/2024     02/02/2024    CREATININE 0.59 02/02/2024    BUN 15 02/02/2024    CO2 31 02/02/2024    TSH 3.47 11/18/2022    PSA 0.50 07/01/2020    INR 1.1 10/25/2021    HGBA1C 7.4 (H) 02/02/2024     ASSESSMENT:  Problem List Items Addressed This Visit       Benign prostatic hyperplasia - Primary    Relevant Orders    Measure post void residual (Completed)     Other Visit Diagnoses       OAB (overactive bladder)              PLAN:  1. BPH with LUTS - s/p Urolift 2019 and PVP 11/08/2021  2. OAB symptoms - excellent response to Gemtesa.    Urinary symptoms remain well-controlled on Gemtesa.  Denies any side-effects and wishes to continue.   No acute or worsening urinary issues at this time.  Refill provided, RTC in 1 year for annual follow up.     All questions were answered to the patient’s satisfaction.  Patient agrees with the plan and wishes to proceed.  Follow-up will be scheduled appropriately.     Scribed for Dr. Sandy Hinojosa by Brenden Schwarz.  I, Dr. Sandy Hinojosa have personally reviewed and agreed with the information entered by the Virtual Scribe. 03/01/24

## 2024-03-05 ASSESSMENT — DERMATOLOGY QUALITY OF LIFE (QOL) ASSESSMENT
RATE HOW BOTHERED YOU ARE BY EFFECTS OF YOUR SKIN PROBLEMS ON YOUR ACTIVITIES (EG, GOING OUT, ACCOMPLISHING WHAT YOU WANT, WORK ACTIVITIES OR YOUR RELATIONSHIPS WITH OTHERS): 0 - NEVER BOTHERED
RATE HOW BOTHERED YOU ARE BY SYMPTOMS OF YOUR SKIN PROBLEM (EG, ITCHING, STINGING BURNING, HURTING OR SKIN IRRITATION): 6 - ALWAYS BOTHERED
RATE HOW EMOTIONALLY BOTHERED YOU ARE BY YOUR SKIN PROBLEM (FOR EXAMPLE, WORRY, EMBARRASSMENT, FRUSTRATION): 5
RATE HOW BOTHERED YOU ARE BY SYMPTOMS OF YOUR SKIN PROBLEM (EG, ITCHING, STINGING BURNING, HURTING OR SKIN IRRITATION): 6 - ALWAYS BOTHERED
RATE HOW EMOTIONALLY BOTHERED YOU ARE BY YOUR SKIN PROBLEM (FOR EXAMPLE, WORRY, EMBARRASSMENT, FRUSTRATION): 5
RATE HOW BOTHERED YOU ARE BY EFFECTS OF YOUR SKIN PROBLEMS ON YOUR ACTIVITIES (EG, GOING OUT, ACCOMPLISHING WHAT YOU WANT, WORK ACTIVITIES OR YOUR RELATIONSHIPS WITH OTHERS): 0 - NEVER BOTHERED

## 2024-03-06 ENCOUNTER — TELEMEDICINE (OUTPATIENT)
Dept: PHARMACY | Facility: HOSPITAL | Age: 69
End: 2024-03-06
Payer: COMMERCIAL

## 2024-03-06 ENCOUNTER — OFFICE VISIT (OUTPATIENT)
Dept: DERMATOLOGY | Facility: CLINIC | Age: 69
End: 2024-03-06
Payer: COMMERCIAL

## 2024-03-06 DIAGNOSIS — E08.00 DIABETES MELLITUS DUE TO UNDERLYING CONDITION WITH HYPEROSMOLARITY WITHOUT COMA, WITHOUT LONG-TERM CURRENT USE OF INSULIN (MULTI): ICD-10-CM

## 2024-03-06 DIAGNOSIS — D17.1 LIPOMA OF TORSO: ICD-10-CM

## 2024-03-06 DIAGNOSIS — D48.5 NEOPLASM OF UNCERTAIN BEHAVIOR OF SKIN: Primary | ICD-10-CM

## 2024-03-06 DIAGNOSIS — L82.1 SEBORRHEIC KERATOSIS: ICD-10-CM

## 2024-03-06 PROCEDURE — 1159F MED LIST DOCD IN RCRD: CPT | Performed by: DERMATOLOGY

## 2024-03-06 PROCEDURE — 99213 OFFICE O/P EST LOW 20 MIN: CPT | Performed by: DERMATOLOGY

## 2024-03-06 PROCEDURE — 3051F HG A1C>EQUAL 7.0%<8.0%: CPT | Performed by: DERMATOLOGY

## 2024-03-06 PROCEDURE — 88305 TISSUE EXAM BY PATHOLOGIST: CPT | Performed by: DERMATOLOGY

## 2024-03-06 PROCEDURE — 1125F AMNT PAIN NOTED PAIN PRSNT: CPT | Performed by: DERMATOLOGY

## 2024-03-06 PROCEDURE — 3048F LDL-C <100 MG/DL: CPT | Performed by: DERMATOLOGY

## 2024-03-06 PROCEDURE — 1036F TOBACCO NON-USER: CPT | Performed by: DERMATOLOGY

## 2024-03-06 PROCEDURE — 1160F RVW MEDS BY RX/DR IN RCRD: CPT | Performed by: DERMATOLOGY

## 2024-03-06 PROCEDURE — 11301 SHAVE SKIN LESION 0.6-1.0 CM: CPT | Performed by: DERMATOLOGY

## 2024-03-06 PROCEDURE — 3062F POS MACROALBUMINURIA REV: CPT | Performed by: DERMATOLOGY

## 2024-03-06 ASSESSMENT — ITCH NUMERIC RATING SCALE: HOW SEVERE IS YOUR ITCHING?: 8

## 2024-03-06 NOTE — PROGRESS NOTES
Pharmacist Clinic: Diabetes Management  Casey Raya is a 68 y.o. male was referred to Clinical Pharmacy Team for diabetes management.   Referring Provider: Ruthie Renee DO    Diabetes  He presents for his initial diabetic visit. He has type 2 diabetes mellitus. His disease course has been worsening. There are no hypoglycemic complications. There are no diabetic complications. Risk factors for coronary artery disease include diabetes mellitus, dyslipidemia, hypertension and male sex. Current diabetic treatments: GLP1a + metformin. He is compliant with treatment all of the time. His weight is stable. There is no change in his home blood glucose trend. An ACE inhibitor/angiotensin II receptor blocker is being taken.   Recently started Ozempic. Had aches two days after dose. Mild nausea. No vomiting or diarrhea. Slightly reduced appetite.   Started using FS [place] 2 one month ago. Reports discrepancies with fingerstick checks (CGM running ~20 pts higher)  H/o myaglia with atorvastatin, now on simvastatin with COQ10 and tolerating     Pertinent PMH Review:  - PMH of Pancreatitis: No  - PMH/FH of Medullary Thyroid Cancer: No  - PMH of Retinopathy: No  - PMH of Urinary Tract Infections: No     LAB REVIEW   Lab Results   Component Value Date    LDLCALC 75 02/02/2024    CREATININE 0.59 02/02/2024      Lab Results   Component Value Date    HGBA1C 7.4 (H) 02/02/2024    HGBA1C 7.2 (A) 11/02/2023    HGBA1C 7.2 11/02/2023     Lab Results   Component Value Date    TRIG 257 (H) 02/02/2024    TRIG 263 (H) 11/18/2022    TRIG 174 (H) 09/15/2021     The 10-year ASCVD risk score (Jonatan MARISCAL, et al., 2019) is: 30.2%    Values used to calculate the score:      Age: 68 years      Sex: Male      Is Non- : No      Diabetic: Yes      Tobacco smoker: No      Systolic Blood Pressure: 134 mmHg      Is BP treated: No      HDL Cholesterol: 33.1 mg/dL      Total Cholesterol: 159 mg/dL     DIABETES ASSESSMENT  CURRENT  PHARMACOTHERAPY  - Ozempic 0.25mg subcutaneous once weekly (Thursdays)  - Metformin 1000mg twice daily    Allergies: Bupropion, Cat dander, Dog dander, Oxycodone-acetaminophen, and Prednisone     SECONDARY PREVENTION  - Statin? Yes   - ACE-I/ARB? Yes  - Aspirin? Yes    HISTORICAL PHARMACOTHERAPY  - Farxiga (urinary frequency)    SMBG MEASUREMENTS  Patient is using: continuous glucose monitor + glucometer   Patient does not report symptoms of hypoglycemia.   Patient does not report symptoms of hyperglycemia.     AFFORDABILITY/ADHERENCE   - No issues  - $42/mo    Assessment/Plan   Problem List Items Addressed This Visit       Diabetes mellitus (CMS/[facility])    Relevant Medications    semaglutide 0.25 mg or 0.5 mg (2 mg/3 mL) pen injector    Other Relevant Orders    Follow Up In Clinical Pharmacy     Patients diabetes needs adjustment with most recent A1c of 7.4% (Goal < 7%).   Continue Ozempic 0.25mg subcutaneous once weekly for 2 more weeks. Then increase to 0.5mg once weekly. Continue metformin 1000mg twice daily.    Education Provided to Patient:   - Counseled patient on Ozempic MOA, expectations, side effects, duration of therapy, administration, and monitoring parameters.  - Provided detailed dosing and administration counseling to ensure proper technique.   - Reviewed Ozempic titration schedule, starting with 0.25 mg once weekly for 4 weeks, then continuing on 0.5 mg once weekly. Pt verbalized understanding.  - Counseled patient on the benefits of GLP-1ra, such as cardiovascular risk reduction, glycemic control, and weight loss potential.  - Advised patient that they may experience improved satiety after meals and portion sizes of meals may be reduced as doses of Ozempic increase.  - Counseled patient to avoid foods that are fatty/oily as this may precipitate the nausea/GI upset that may occur with new start Ozempic.     PharmD Follow-up: 4/3   PCP Follow-up: 5/10    Notify your healthcare provider if you have any  of the following:  -Diarrhea or vomiting for more than six hours  -Blood sugar >300 mg/dL more than once  -Low blood sugar (<70 mg/dL)  -Questions about your medications    Thank you,  Yanet Reed, PharmD    Continue all meds under the continuation of care with the referring provider and clinical pharmacy team.  Verbal consent to manage patient's drug therapy was obtained. They were informed they may decline to participate or withdraw from participation in pharmacy services at any time.

## 2024-03-06 NOTE — PROGRESS NOTES
Subjective     Casey Raya is a 68 y.o. male who presents for the following: Suspicious Skin Lesion (Right mid back - pt states thinks area is due to pressure from how he sits on his couch. States occasionally itches. Accompanied by spouse, Brigid. ).     Review of Systems:  No other skin or systemic complaints other than what is documented elsewhere in the note.    The following portions of the chart were reviewed this encounter and updated as appropriate:   Tobacco  Allergies  Meds  Problems  Med Hx  Surg Hx  Fam Hx         Skin Cancer History  No skin cancer on file.      Specialty Problems          Dermatology Problems    Lipoma of skin and subcutaneous tissue        Objective   Well appearing patient in no apparent distress; mood and affect are within normal limits.    A focused skin examination was performed. All findings within normal limits unless otherwise noted below.    Assessment/Plan   1. Neoplasm of uncertain behavior of skin  Right Flank  8mm erythematous papule              Shave removal    Lesion diameter (cm):  0.8  Informed consent: discussed and consent obtained    Timeout: patient name, date of birth, surgical site, and procedure verified    Procedure prep:  Patient was prepped and draped  Anesthesia: the lesion was anesthetized in a standard fashion    Anesthetic:  1% lidocaine w/ epinephrine 1-100,000 local infiltration  Instrument used: DermaBlade    Hemostasis achieved with: aluminum chloride    Outcome: patient tolerated procedure well    Post-procedure details: sterile dressing applied and wound care instructions given    Dressing type: bandage and petrolatum      Staff Communication: Dermatology Local Anesthesia: Site Location: R flank 1 % Lidocaine / Epinephrine - Amount: 0.5cc    Specimen 1 - Dermatopathology- DERM LAB  Differential Diagnosis: r/o irritated nevus  Check Margins Yes/No?:    Comments:    Dermpath Lab: Routine Histopathology (formalin-fixed tissue)    2.  Seborrheic keratosis (4)  Left Lower Back, Left Upper Back, Right Lower Back, Right Upper Back  Stuck on, waxy macule(s)/papule(s)/plaque(s) with comedo-like openings and milia like cysts    -Discussed the nature of the diagnosis  -Reassurance, recommend continued observation    3. Lipoma of torso  Right Lower Back  Subcutaneous soft mobile plaque/nodule    -Discussed nature of diagnosis  -Reassurance, recommend observation  -May refer to Mohs surgery if patient desires removal        Follow up as needed; patient declines FSEs  Discussed if there are any changes or development of concerning symptoms (lesion/skin condition is changing, bleeding, enlarging, or worsening) the patient is to contact my office. The patient verbalizes understanding.    Jyoti Luong MD  3/6/2024

## 2024-03-08 LAB
LABORATORY COMMENT REPORT: NORMAL
PATH REPORT.FINAL DX SPEC: NORMAL
PATH REPORT.GROSS SPEC: NORMAL
PATH REPORT.MICROSCOPIC SPEC OTHER STN: NORMAL
PATH REPORT.RELEVANT HX SPEC: NORMAL
PATH REPORT.TOTAL CANCER: NORMAL

## 2024-03-11 NOTE — RESULT ENCOUNTER NOTE
Pt was contacted at this time and notified of benign results at this time. No further questions noted.    Bola Contreras LPN

## 2024-03-15 DIAGNOSIS — E11.9 TYPE 2 DIABETES MELLITUS WITHOUT COMPLICATIONS (MULTI): ICD-10-CM

## 2024-03-17 RX ORDER — METFORMIN HYDROCHLORIDE 1000 MG/1
1000 TABLET ORAL 2 TIMES DAILY
Qty: 180 TABLET | Refills: 3 | Status: SHIPPED | OUTPATIENT
Start: 2024-03-17 | End: 2024-05-28 | Stop reason: DRUGHIGH

## 2024-03-18 ENCOUNTER — TELEPHONE (OUTPATIENT)
Dept: PHARMACY | Facility: HOSPITAL | Age: 69
End: 2024-03-18
Payer: COMMERCIAL

## 2024-03-18 NOTE — TELEPHONE ENCOUNTER
Patient called with c/o diarrhea x 5 days. He reports he initially had constipation when started Ozempic 0.25mg so took laxatives and since then resulted in diarrhea. Feels it is starting to improve today. He is also taking metformin. Reports diarrhea is tarry, darker than normal but not black. Denies blood in stool, denies abdominal pain. Passing gas. Denies nausea or vomiting. Eating and drinking normally.    Advised to reduce metformin to 500 mg twice daily and to hold Ozempic until symptoms resolve. Will follow up on Thursday to re-assess symptoms and medication dosing. Advised to call PCP office if diarrhea worsens.

## 2024-03-21 ENCOUNTER — TELEPHONE (OUTPATIENT)
Dept: PHARMACY | Facility: HOSPITAL | Age: 69
End: 2024-03-21
Payer: COMMERCIAL

## 2024-03-21 NOTE — TELEPHONE ENCOUNTER
Follow up phone call to assess diarrhea / GI symptoms. Patient states he stopped drinking Boost every morning and since then diarrhea resolved. Resumed Ozempic at 0.25mg once weekly today. Advised to continue at this dose until next follow up on 4/3.

## 2024-04-03 ENCOUNTER — TELEMEDICINE (OUTPATIENT)
Dept: PHARMACY | Facility: HOSPITAL | Age: 69
End: 2024-04-03
Payer: COMMERCIAL

## 2024-04-03 DIAGNOSIS — E78.5 HYPERLIPIDEMIA, UNSPECIFIED HYPERLIPIDEMIA TYPE: ICD-10-CM

## 2024-04-03 DIAGNOSIS — E08.00 DIABETES MELLITUS DUE TO UNDERLYING CONDITION WITH HYPEROSMOLARITY WITHOUT COMA, WITHOUT LONG-TERM CURRENT USE OF INSULIN (MULTI): ICD-10-CM

## 2024-04-03 RX ORDER — SIMVASTATIN 10 MG/1
10 TABLET, FILM COATED ORAL DAILY
Qty: 90 TABLET | Refills: 3 | Status: SHIPPED | OUTPATIENT
Start: 2024-04-03

## 2024-04-03 RX ORDER — BLOOD-GLUCOSE SENSOR
EACH MISCELLANEOUS
Qty: 6 EACH | Refills: 3 | Status: SHIPPED | OUTPATIENT
Start: 2024-04-03

## 2024-04-03 NOTE — PROGRESS NOTES
Pharmacist Clinic: Diabetes Management  Casey Raya is a 68 y.o. male was referred to Clinical Pharmacy Team for diabetes management.   Referring Provider: Ruthie Renee DO    Diabetes  He presents for his follow-up diabetic visit. He has type 2 diabetes mellitus. His disease course has been worsening. There are no hypoglycemic complications. There are no diabetic complications. Risk factors for coronary artery disease include diabetes mellitus, dyslipidemia, hypertension and male sex. Current diabetic treatments: GLP1a + metformin. He is compliant with treatment all of the time. His weight is stable. There is no change in his home blood glucose trend. An ACE inhibitor/angiotensin II receptor blocker is being taken.   Since last visit Ozempic dose reduced to 0.25mg weekly and metformin reduced to 500mg twice daily due to diarrhea. Stopped drinking Boost and diarrhea resolved, reintroduced and diarrhea returned.   Reports FS Brandon 2 readings inconsistent and variable, asking for FS Brandon 3    Pertinent PMH Review:  - PMH of Pancreatitis: No  - PMH/FH of Medullary Thyroid Cancer: No  - PMH of Retinopathy: No  - PMH of Urinary Tract Infections: No     LAB REVIEW   Lab Results   Component Value Date    LDLCALC 75 02/02/2024    CREATININE 0.59 02/02/2024      Lab Results   Component Value Date    HGBA1C 7.4 (H) 02/02/2024    HGBA1C 7.2 (A) 11/02/2023    HGBA1C 7.2 11/02/2023     Lab Results   Component Value Date    TRIG 257 (H) 02/02/2024    TRIG 263 (H) 11/18/2022    TRIG 174 (H) 09/15/2021     The 10-year ASCVD risk score (Jonatan MARISCAL, et al., 2019) is: 30.2%    Values used to calculate the score:      Age: 68 years      Sex: Male      Is Non- : No      Diabetic: Yes      Tobacco smoker: No      Systolic Blood Pressure: 134 mmHg      Is BP treated: No      HDL Cholesterol: 33.1 mg/dL      Total Cholesterol: 159 mg/dL     DIABETES ASSESSMENT  CURRENT PHARMACOTHERAPY  - Ozempic 0.25mg  subcutaneous once weekly (Thursdays)  - Metformin 500mg twice daily     Allergies: Bupropion, Cat dander, Dog dander, Oxycodone-acetaminophen, and Prednisone     SECONDARY PREVENTION  - Statin? Yes   - ACE-I/ARB? Yes  - Aspirin? Yes    HISTORICAL PHARMACOTHERAPY  - Farxiga (urinary frequency)  - H/o myaglia with atorvastatin, now on simvastatin with COQ10 and tolerating     SMBG MEASUREMENTS  Patient is using: continuous glucose monitor + glucometer   Patient does not report symptoms of hypoglycemia.   Patient does not report symptoms of hyperglycemia.     AFFORDABILITY/ADHERENCE   - No issues  - $42/mo    Assessment/Plan   Problem List Items Addressed This Visit       Diabetes mellitus (CMS/[facility])   Patients diabetes needs adjustment with most recent A1c of 7.4% (Goal < 7%).   Increase Ozempic to 0.5mg subcutaneous once weekly (Diarrhea secondary to boost, likely not GLP1a induced)  Continue metformin 500mg twice daily - consider dose increase back to 1000mg twice daily pending BG readings after dose increase of Ozempic  FS Brandon 3 sensors ordered     Education Provided to Patient:   - Counseled patient on Ozempic MOA, expectations, side effects, duration of therapy, administration, and monitoring parameters.  - Provided detailed dosing and administration counseling to ensure proper technique.   - Reviewed Ozempic titration schedule, starting with 0.25 mg once weekly for 4 weeks, then continuing on 0.5 mg once weekly. Pt verbalized understanding.  - Counseled patient on the benefits of GLP-1ra, such as cardiovascular risk reduction, glycemic control, and weight loss potential.  - Advised patient that they may experience improved satiety after meals and portion sizes of meals may be reduced as doses of Ozempic increase.  - Counseled patient to avoid foods that are fatty/oily as this may precipitate the nausea/GI upset that may occur with new start Ozempic.     PharmD Follow-up: 4/23 at 10 AM  PCP Follow-up:  5/13    Notify your healthcare provider if you have any of the following:  -Diarrhea or vomiting for more than six hours  -Blood sugar >300 mg/dL more than once  -Low blood sugar (<70 mg/dL)  -Questions about your medications    Thank you,  Yanet Reed, PharmD    Continue all meds under the continuation of care with the referring provider and clinical pharmacy team.  Verbal consent to manage patient's drug therapy was obtained. They were informed they may decline to participate or withdraw from participation in pharmacy services at any time.

## 2024-04-23 ENCOUNTER — TELEMEDICINE (OUTPATIENT)
Dept: PHARMACY | Facility: HOSPITAL | Age: 69
End: 2024-04-23

## 2024-04-23 DIAGNOSIS — E08.00 DIABETES MELLITUS DUE TO UNDERLYING CONDITION WITH HYPEROSMOLARITY WITHOUT COMA, WITHOUT LONG-TERM CURRENT USE OF INSULIN (MULTI): ICD-10-CM

## 2024-04-23 NOTE — PROGRESS NOTES
Pharmacist Clinic: Diabetes Management  Casey Raya is a 68 y.o. male was referred to Clinical Pharmacy Team for diabetes management.   Referring Provider: Ruthie Renee DO    Diabetes  He presents for his follow-up diabetic visit. He has type 2 diabetes mellitus. His disease course has been worsening. There are no hypoglycemic complications. There are no diabetic complications. Risk factors for coronary artery disease include diabetes mellitus, dyslipidemia, hypertension and male sex. Current diabetic treatments: GLP1a + metformin. He is compliant with treatment all of the time. His weight is stable. There is no change in his home blood glucose trend. An ACE inhibitor/angiotensin II receptor blocker is being taken.   Since last visit Ozempic dose increased to 0.5mg weekly. 3 doses so far. Had diarrhea initially, now resolved. Otherwise denies GI side effects. Stopped carbonated water and sugar substitutes which also helped.  Appetite is reduced, half portion sizes. Reports weight loss of 12-15 lbs, down in pant size.   Upgraded to FS Brandon 3, likes it. Reports generally no BG above 150.     Pertinent PMH Review:  - PMH of Pancreatitis: No  - PMH/FH of Medullary Thyroid Cancer: No  - PMH of Retinopathy: No  - PMH of Urinary Tract Infections: No     LAB REVIEW   Lab Results   Component Value Date    LDLCALC 75 02/02/2024    CREATININE 0.59 02/02/2024      Lab Results   Component Value Date    HGBA1C 7.4 (H) 02/02/2024    HGBA1C 7.2 (A) 11/02/2023    HGBA1C 7.2 11/02/2023     Lab Results   Component Value Date    TRIG 257 (H) 02/02/2024    TRIG 263 (H) 11/18/2022    TRIG 174 (H) 09/15/2021     The 10-year ASCVD risk score (Jonatan MARISCAL, et al., 2019) is: 30.2%    Values used to calculate the score:      Age: 68 years      Sex: Male      Is Non- : No      Diabetic: Yes      Tobacco smoker: No      Systolic Blood Pressure: 134 mmHg      Is BP treated: No      HDL Cholesterol: 33.1 mg/dL       Total Cholesterol: 159 mg/dL     DIABETES ASSESSMENT  CURRENT PHARMACOTHERAPY  - Ozempic 0.5mg subcutaneous once weekly (Thursdays)  - Metformin 500mg twice daily     Allergies: Bupropion, Cat dander, Dog dander, Oxycodone-acetaminophen, and Prednisone     SECONDARY PREVENTION  - Statin? Yes   - ACE-I/ARB? Yes  - Aspirin? Yes    HISTORICAL PHARMACOTHERAPY  - Farxiga (urinary frequency)  - H/o myaglia with atorvastatin, now on simvastatin with COQ10 and tolerating     SMBG MEASUREMENTS  Patient is using: continuous glucose monitor + glucometer    (down from 140)  None > 150 or < 70  Patient does not report symptoms of hypoglycemia.   Patient does not report symptoms of hyperglycemia.     AFFORDABILITY/ADHERENCE   - No cost or adherence barriers identified     Assessment/Plan   Problem List Items Addressed This Visit       Diabetes mellitus (Multi)   Patients diabetes needs adjustment with most recent A1c of 7.4% (Goal < 7%).   Continue Ozempic 0.5mg subcutaneous once weekly and metformin 500mg twice daily   Due to significant response with glucose control and weight loss would like to continue at current dosage for now. Patient would like to consider increasing Ozempic dose at later date with goal of stopping metformin and maintaining glycemic control with Ozempic monotherapy to reduce pill burden    Education Provided to Patient:   - Counseled patient on Ozempic MOA, expectations, side effects, duration of therapy, administration, and monitoring parameters.  - Provided detailed dosing and administration counseling to ensure proper technique.   - Reviewed Ozempic titration schedule, starting with 0.25 mg once weekly for 4 weeks, then continuing on 0.5 mg once weekly. Pt verbalized understanding.  - Counseled patient on the benefits of GLP-1ra, such as cardiovascular risk reduction, glycemic control, and weight loss potential.  - Advised patient that they may experience improved satiety after meals and portion  sizes of meals may be reduced as doses of Ozempic increase.  - Counseled patient to avoid foods that are fatty/oily as this may precipitate the nausea/GI upset that may occur with new start Ozempic.     PharmD Follow-up: 5/28  PCP Follow-up: 5/13    Notify your healthcare provider if you have any of the following:  -Diarrhea or vomiting for more than six hours  -Blood sugar >300 mg/dL more than once  -Low blood sugar (<70 mg/dL)  -Questions about your medications    Thank you,  Yanet Reed, PharmD    Continue all meds under the continuation of care with the referring provider and clinical pharmacy team.  Verbal consent to manage patient's drug therapy was obtained. They were informed they may decline to participate or withdraw from participation in pharmacy services at any time.

## 2024-05-10 ENCOUNTER — APPOINTMENT (OUTPATIENT)
Dept: PRIMARY CARE | Facility: CLINIC | Age: 69
End: 2024-05-10
Payer: COMMERCIAL

## 2024-05-10 ASSESSMENT — ENCOUNTER SYMPTOMS
WEAKNESS: 1
TREMORS: 0
BLURRED VISION: 0
SWEATS: 0
FATIGUE: 1
SEIZURES: 0
BLACKOUTS: 0
NERVOUS/ANXIOUS: 0
HEADACHES: 0
CONFUSION: 0
POLYDIPSIA: 0
VISUAL CHANGE: 0
DIZZINESS: 0
POLYPHAGIA: 0
WEIGHT LOSS: 1
HUNGER: 0
SPEECH DIFFICULTY: 0

## 2024-05-13 ENCOUNTER — OFFICE VISIT (OUTPATIENT)
Dept: PRIMARY CARE | Facility: CLINIC | Age: 69
End: 2024-05-13
Payer: COMMERCIAL

## 2024-05-13 VITALS
HEART RATE: 60 BPM | SYSTOLIC BLOOD PRESSURE: 126 MMHG | BODY MASS INDEX: 27.83 KG/M2 | OXYGEN SATURATION: 96 % | DIASTOLIC BLOOD PRESSURE: 82 MMHG | WEIGHT: 183 LBS

## 2024-05-13 DIAGNOSIS — J34.89 NASAL LESION: ICD-10-CM

## 2024-05-13 DIAGNOSIS — E08.00 DIABETES MELLITUS DUE TO UNDERLYING CONDITION WITH HYPEROSMOLARITY WITHOUT COMA, WITHOUT LONG-TERM CURRENT USE OF INSULIN (MULTI): Primary | ICD-10-CM

## 2024-05-13 LAB — POC HEMOGLOBIN A1C: 6.2 % (ref 4.2–6.5)

## 2024-05-13 PROCEDURE — 3062F POS MACROALBUMINURIA REV: CPT | Performed by: FAMILY MEDICINE

## 2024-05-13 PROCEDURE — 3079F DIAST BP 80-89 MM HG: CPT | Performed by: FAMILY MEDICINE

## 2024-05-13 PROCEDURE — 1160F RVW MEDS BY RX/DR IN RCRD: CPT | Performed by: FAMILY MEDICINE

## 2024-05-13 PROCEDURE — 1036F TOBACCO NON-USER: CPT | Performed by: FAMILY MEDICINE

## 2024-05-13 PROCEDURE — 1159F MED LIST DOCD IN RCRD: CPT | Performed by: FAMILY MEDICINE

## 2024-05-13 PROCEDURE — 3048F LDL-C <100 MG/DL: CPT | Performed by: FAMILY MEDICINE

## 2024-05-13 PROCEDURE — 99214 OFFICE O/P EST MOD 30 MIN: CPT | Performed by: FAMILY MEDICINE

## 2024-05-13 PROCEDURE — 3074F SYST BP LT 130 MM HG: CPT | Performed by: FAMILY MEDICINE

## 2024-05-13 PROCEDURE — 3051F HG A1C>EQUAL 7.0%<8.0%: CPT | Performed by: FAMILY MEDICINE

## 2024-05-13 PROCEDURE — 83036 HEMOGLOBIN GLYCOSYLATED A1C: CPT | Performed by: FAMILY MEDICINE

## 2024-05-13 RX ORDER — METFORMIN HYDROCHLORIDE 500 MG/1
500 TABLET ORAL
COMMUNITY

## 2024-05-13 ASSESSMENT — ENCOUNTER SYMPTOMS
BLURRED VISION: 0
WEIGHT LOSS: 0
FATIGUE: 0
HUNGER: 0
SWEATS: 0
NERVOUS/ANXIOUS: 0
BLACKOUTS: 0
POLYPHAGIA: 0
SEIZURES: 0
TREMORS: 0
VISUAL CHANGE: 0
WEAKNESS: 0
DIZZINESS: 0
CONFUSION: 0
SPEECH DIFFICULTY: 0
POLYDIPSIA: 0
HEADACHES: 0

## 2024-05-13 ASSESSMENT — PATIENT HEALTH QUESTIONNAIRE - PHQ9
SUM OF ALL RESPONSES TO PHQ9 QUESTIONS 1 AND 2: 0
1. LITTLE INTEREST OR PLEASURE IN DOING THINGS: NOT AT ALL
2. FEELING DOWN, DEPRESSED OR HOPELESS: NOT AT ALL

## 2024-05-13 NOTE — PATIENT INSTRUCTIONS
A1c is so much better at 6.2%.     You are down another 4# since   Please stop the metformin all together     Continue to follow up with Yanet for dose adjustment     Colon screening in August 2, 2023. Repeat colonoscopy in 10 years for screening purposes.     Labs on 2/2/24-Cholesterol is still elevated too, especially the triglycerides at 257, goal is to get this around 100. Liver and kidney function are normal. Vitamin B12 is in range, normal prostate level. Normal blood counts, no evidence of anemia or infection.     Please follow up with the dermatologist for the nasal lesion   -- please set up a consultation with Dr Fagan at [place]     follow up with urologist as recommended     Continue the cholesterol medications.     Blood pressure looks good 126/82.     You are down another 4# which is good with the new diet of lower carbs and increase fruits/veggies     Please get an up dated eye and dental examination   Also check feet regularly.     call if you need refills.     follow up in 4 months for a repeat A1c check or sooner as needed.

## 2024-05-13 NOTE — PROGRESS NOTES
Subjective   Casey Raya is a 68 y.o. male who presents for Follow-up (3 month follow up).  Accompanied by his wife.     Diabetes  He has type 2 diabetes mellitus. No MedicAlert identification noted. The initial diagnosis of diabetes was made 10 years ago. Pertinent negatives for hypoglycemia include no confusion, dizziness, headaches, hunger, mood changes, nervousness/anxiousness, pallor, seizures, sleepiness, speech difficulty, sweats or tremors. Associated symptoms include foot paresthesias. Pertinent negatives for diabetes include no blurred vision, no chest pain, no fatigue, no foot ulcerations, no polydipsia, no polyphagia, no polyuria, no visual change, no weakness and no weight loss. Pertinent negatives for hypoglycemia complications include no blackouts, no hospitalization, no nocturnal hypoglycemia, no required assistance and no required glucagon injection. Symptoms are stable. Diabetic complications include impotence. Pertinent negatives for diabetic complications include no CVA, heart disease, nephropathy, peripheral neuropathy, PVD or retinopathy. Risk factors for coronary artery disease include dyslipidemia, family history and obesity. Current diabetic treatment includes diet and oral agent (monotherapy). He is compliant with treatment most of the time. His weight is fluctuating minimally. He is following a diabetic diet. Meal planning includes carbohydrate counting. He has not had a previous visit with a dietitian. He participates in exercise three times a week. He monitors blood glucose at home 1-2 x per day. Blood glucose monitoring compliance is fair. There is no change in his home blood glucose trend. His breakfast blood glucose is taken after 10 am. His breakfast blood glucose range is generally 110-130 mg/dl. His lunch blood glucose is taken between 11-12 pm. His lunch blood glucose range is generally 130-140 mg/dl. His dinner blood glucose is taken between 7-8 pm. His dinner blood glucose  "range is generally 140-180 mg/dl. His overall blood glucose range is 140-180 mg/dl. He does not see a podiatrist.Eye exam is current.     1. Chronic neck and Back Pain-   - would like a MRI of the L spine 3/31/22- no major changes to the lumbar spine, still have disease and would recommend seeing an orthopedic spine doc to review and discuss with your sympotms. L4/L5: Left hemilaminotomy (post surgery changes) . Decreased left paracentral protrusion and perineural fibrosis (this is a good thing) . L5/S1: Moderate left foraminal stenosis.  - tried an epidural nerve block which didn’t help   - previous s/p L4/L5 back surgery performed by Vignesh Kevin  - Dorita rec trial of PT in April 2022  - increased gabapentin by 200 mg (1400 per day)     #) trigger finger, - release by Dr Newton in 2022    #) T2DM    3 mo A1C follow up-- 6.3% today (up from 6.2%) --> 7.4% --> 6.1% --> 7.2% --> 6.2% today! 5/13/24  - on metformin 1000 mg BID - stopped now that A1c was good and trying to try the diarrhea   - was on statin, and stopped because was worried it was making sugars are worse     #)  Pain Management- is better with less activity  - not following with them any longer   - Gabapentin - is helping, cymbalta  - wants some Vicodin for his back until he can see Dr. Kevin    #). Shoulder Pain, thinks it is from his neck- the same/ \"ok\" - both hurt   - Dr. High had an appointment and he said that there is nothing wrong with the shoulder structurally according to the patient    #) BPH with LUTS - follows with urology yearly.   - on gemtesa (stopped the myrbetric)   resolved s/p Urolift 2019 and PVP 11/08/2021   - could not tolerate the SGLT2 due to worsening urinary symptoms      ROS was completed and all systems are negative with the exception of what was noted in the the HPI.     Objective     /82   Pulse 60   Wt 83 kg (183 lb)   SpO2 96%   BMI 27.83 kg/m²      Physical Exam  Gen: A+O, NAD  HEENT: NC/AT, EOMI, no " LAD, oropharynx clear, no edema or erythema, TM visualized and normal  CV: RRR, No M/R/G  Resp: CTAB, No W/R/C  Abd: Soft, NT/ND  Neuro: PERRL, moves all extremities equally, normal gait   Skin: No rashes, no LE edema or varicosities   MSK: Grossly intact strength and reflexes; normal gait  Psych: Normal mood and affect    Assessment/Plan   Problem List Items Addressed This Visit    None    A1c is so much better at 6.2%.     You are down another 4# since   Please stop the metformin all together     Continue to follow up with Yanet for dose adjustment     Colon screening in August 2, 2023. Repeat colonoscopy in 10 years for screening purposes.     Labs on 2/2/24-Cholesterol is still elevated too, especially the triglycerides at 257, goal is to get this around 100. Liver and kidney function are normal. Vitamin B12 is in range, normal prostate level. Normal blood counts, no evidence of anemia or infection.     Please follow up with the dermatologist for the nasal lesion   -- please set up a consultation with Dr Fagan at [place]     follow up with urologist as recommended     Continue the cholesterol medications.     Blood pressure looks good 126/82.     You are down another 4# which is good with the new diet of lower carbs and increase fruits/veggies     Please get an up dated eye and dental examination   Also check feet regularly.     call if you need refills.     follow up in 4 months for a repeat A1c check or sooner as needed.     Ruthie Renee DO, MSMed, ABOM  [address]   [address]   [place] [address]  Ph. [phone]  Fx. [phone]

## 2024-05-23 DIAGNOSIS — G89.4 CHRONIC PAIN SYNDROME: ICD-10-CM

## 2024-05-23 RX ORDER — GABAPENTIN 600 MG/1
600 TABLET ORAL 3 TIMES DAILY
Qty: 180 TABLET | Refills: 0 | Status: SHIPPED | OUTPATIENT
Start: 2024-05-23

## 2024-05-28 ENCOUNTER — TELEMEDICINE (OUTPATIENT)
Dept: PHARMACY | Facility: HOSPITAL | Age: 69
End: 2024-05-28
Payer: COMMERCIAL

## 2024-05-28 DIAGNOSIS — E08.00 DIABETES MELLITUS DUE TO UNDERLYING CONDITION WITH HYPEROSMOLARITY WITHOUT COMA, WITHOUT LONG-TERM CURRENT USE OF INSULIN (MULTI): ICD-10-CM

## 2024-05-28 NOTE — PROGRESS NOTES
Pharmacist Clinic: Diabetes Management  Casey Raya is a 69 y.o. male was referred to Clinical Pharmacy Team for diabetes management.   Referring Provider: Ruthie Renee DO    Diabetes  He presents for his follow-up diabetic visit. He has type 2 diabetes mellitus. His disease course has been improving. There are no hypoglycemic complications. There are no diabetic complications. Risk factors for coronary artery disease include diabetes mellitus, dyslipidemia, hypertension and male sex. Current diabetic treatments: GLP1a + metformin. He is compliant with treatment all of the time. His weight is decreasing steadily. His home blood glucose trend is decreasing steadily. An ACE inhibitor/angiotensin II receptor blocker is being taken.   Since last visit, repeat A1c down from 7.2 to 6.2%. Stopped metformin after PCP follow up one month ago and continued Ozempic at 0.5mg weekly. When he stopped metformin BG increased by 20 pts on avg (135 to 150 range) so he has since resumed it.   Lost 15 lbs since starting Ozempic. Tolerating well, denies GI side effects.    Pertinent PMH Review:  - PMH of Pancreatitis: No  - PMH/FH of Medullary Thyroid Cancer: No  - PMH of Retinopathy: No  - PMH of Urinary Tract Infections: No     LAB REVIEW   Lab Results   Component Value Date    LDLCALC 75 02/02/2024    CREATININE 0.59 02/02/2024      Lab Results   Component Value Date    HGBA1C 6.2 05/13/2024    HGBA1C 7.4 (H) 02/02/2024    HGBA1C 7.2 (A) 11/02/2023     Lab Results   Component Value Date    TRIG 257 (H) 02/02/2024    TRIG 263 (H) 11/18/2022    TRIG 174 (H) 09/15/2021     The 10-year ASCVD risk score (Jonatan MARISCAL, et al., 2019) is: 29.4%    Values used to calculate the score:      Age: 69 years      Sex: Male      Is Non- : No      Diabetic: Yes      Tobacco smoker: No      Systolic Blood Pressure: 126 mmHg      Is BP treated: No      HDL Cholesterol: 33.1 mg/dL      Total Cholesterol: 159 mg/dL      DIABETES ASSESSMENT  CURRENT PHARMACOTHERAPY  - Ozempic 0.5mg subcutaneous once weekly (Thursdays)  - Metformin 500mg twice daily     Allergies: Bupropion, Cat dander, Dog dander, Oxycodone-acetaminophen, and Prednisone     SECONDARY PREVENTION  - Statin? Yes   - ACE-I/ARB? Yes    HISTORICAL PHARMACOTHERAPY  - Farxiga (urinary frequency)  - H/o myaglia with atorvastatin, now on simvastatin with COQ10 and tolerating     SMBG MEASUREMENTS  Patient is using: continuous glucose monitor + glucometer   GMI 6.3%  None > 150 or < 70  Patient does not report symptoms of hypoglycemia.   Patient does not report symptoms of hyperglycemia.     AFFORDABILITY/ADHERENCE   - No cost or adherence barriers identified     Assessment/Plan   Problem List Items Addressed This Visit       Diabetes mellitus (Multi)    Relevant Orders    Follow Up In Clinical Pharmacy     Patients diabetes needs adjustment with most recent A1c of 6.2% (Goal < 7%).   Continue Ozempic 0.5mg subcutaneous once weekly and metformin 500mg twice daily   Due to significant response with glucose control and weight loss would like to continue at current dosage for now. He had initial GI intolerance with Ozempic initiation and would like to defer dose escalation for now. May consider increasing Ozempic dose at later date with goal of stopping metformin and maintaining glycemic control with Ozempic monotherapy to reduce pill burden    Education Provided to Patient:   - Counseled patient on Ozempic MOA, expectations, side effects, duration of therapy, administration, and monitoring parameters.  - Provided detailed dosing and administration counseling to ensure proper technique.   - Reviewed Ozempic titration schedule, starting with 0.25 mg once weekly for 4 weeks, then continuing on 0.5 mg once weekly. Pt verbalized understanding.  - Counseled patient on the benefits of GLP-1ra, such as cardiovascular risk reduction, glycemic control, and weight loss potential.  -  Advised patient that they may experience improved satiety after meals and portion sizes of meals may be reduced as doses of Ozempic increase.  - Counseled patient to avoid foods that are fatty/oily as this may precipitate the nausea/GI upset that may occur with new start Ozempic.     PharmD Follow-up: 4 months or sooner as needed; phone number provided   PCP Follow-up: 9/16/24    Notify your healthcare provider if you have any of the following:  -Diarrhea or vomiting for more than six hours  -Blood sugar >300 mg/dL more than once  -Low blood sugar (<70 mg/dL)  -Questions about your medications    Thank you,  Yanet Reed, PharmD    Continue all meds under the continuation of care with the referring provider and clinical pharmacy team.  Verbal consent to manage patient's drug therapy was obtained. They were informed they may decline to participate or withdraw from participation in pharmacy services at any time.

## 2024-05-28 NOTE — Clinical Note
Doing well on Ozempic, he resumed metformin due to elevations after stopping it. Deferred dose increaseof Ozempic now he would like to wait a few mo on the 0.5mg. Thanks!

## 2024-06-30 DIAGNOSIS — E08.00 DIABETES MELLITUS DUE TO UNDERLYING CONDITION WITH HYPEROSMOLARITY WITHOUT COMA, WITHOUT LONG-TERM CURRENT USE OF INSULIN (MULTI): ICD-10-CM

## 2024-07-01 RX ORDER — SEMAGLUTIDE 0.68 MG/ML
INJECTION, SOLUTION SUBCUTANEOUS
Qty: 9 ML | Refills: 1 | Status: SHIPPED | OUTPATIENT
Start: 2024-07-01

## 2024-07-02 ASSESSMENT — DERMATOLOGY QUALITY OF LIFE (QOL) ASSESSMENT
RATE HOW BOTHERED YOU ARE BY SYMPTOMS OF YOUR SKIN PROBLEM (EG, ITCHING, STINGING BURNING, HURTING OR SKIN IRRITATION): 6 - ALWAYS BOTHERED
DATE THE QUALITY-OF-LIFE ASSESSMENT WAS COMPLETED: 67023
RATE HOW BOTHERED YOU ARE BY EFFECTS OF YOUR SKIN PROBLEMS ON YOUR ACTIVITIES (EG, GOING OUT, ACCOMPLISHING WHAT YOU WANT, WORK ACTIVITIES OR YOUR RELATIONSHIPS WITH OTHERS): 3
RATE HOW BOTHERED YOU ARE BY SYMPTOMS OF YOUR SKIN PROBLEM (EG, ITCHING, STINGING BURNING, HURTING OR SKIN IRRITATION): 6 - ALWAYS BOTHERED
RATE HOW EMOTIONALLY BOTHERED YOU ARE BY YOUR SKIN PROBLEM (FOR EXAMPLE, WORRY, EMBARRASSMENT, FRUSTRATION): 5
WHAT SINGLE SKIN CONDITION LISTED BELOW IS THE PATIENT ANSWERING THE QUALITY-OF-LIFE ASSESSMENT QUESTIONS ABOUT: NONE OF THE ABOVE
RATE HOW EMOTIONALLY BOTHERED YOU ARE BY YOUR SKIN PROBLEM (FOR EXAMPLE, WORRY, EMBARRASSMENT, FRUSTRATION): 5
RATE HOW BOTHERED YOU ARE BY EFFECTS OF YOUR SKIN PROBLEMS ON YOUR ACTIVITIES (EG, GOING OUT, ACCOMPLISHING WHAT YOU WANT, WORK ACTIVITIES OR YOUR RELATIONSHIPS WITH OTHERS): 3
WHAT SINGLE SKIN CONDITION LISTED BELOW IS THE PATIENT ANSWERING THE QUALITY-OF-LIFE ASSESSMENT QUESTIONS ABOUT: NONE OF THE ABOVE

## 2024-07-03 ENCOUNTER — OFFICE VISIT (OUTPATIENT)
Dept: DERMATOLOGY | Facility: CLINIC | Age: 69
End: 2024-07-03
Payer: COMMERCIAL

## 2024-07-03 DIAGNOSIS — L81.4 LENTIGO: ICD-10-CM

## 2024-07-03 DIAGNOSIS — L82.1 SEBORRHEIC KERATOSIS: ICD-10-CM

## 2024-07-03 DIAGNOSIS — D48.5 NEOPLASM OF UNCERTAIN BEHAVIOR OF SKIN: Primary | ICD-10-CM

## 2024-07-03 PROCEDURE — 3048F LDL-C <100 MG/DL: CPT | Performed by: DERMATOLOGY

## 2024-07-03 PROCEDURE — 3062F POS MACROALBUMINURIA REV: CPT | Performed by: DERMATOLOGY

## 2024-07-03 PROCEDURE — 99213 OFFICE O/P EST LOW 20 MIN: CPT | Performed by: DERMATOLOGY

## 2024-07-03 PROCEDURE — 3051F HG A1C>EQUAL 7.0%<8.0%: CPT | Performed by: DERMATOLOGY

## 2024-07-03 PROCEDURE — 1160F RVW MEDS BY RX/DR IN RCRD: CPT | Performed by: DERMATOLOGY

## 2024-07-03 PROCEDURE — 11312 SHAVE SKIN LESION 1.1-2.0 CM: CPT | Performed by: DERMATOLOGY

## 2024-07-03 PROCEDURE — 1159F MED LIST DOCD IN RCRD: CPT | Performed by: DERMATOLOGY

## 2024-07-03 PROCEDURE — 1036F TOBACCO NON-USER: CPT | Performed by: DERMATOLOGY

## 2024-07-03 ASSESSMENT — ITCH NUMERIC RATING SCALE: HOW SEVERE IS YOUR ITCHING?: 1

## 2024-07-03 NOTE — PROGRESS NOTES
Subjective     Casey Raya is a 69 y.o. male who presents for the following: Suspicious Skin Lesion (Nose lesion. Accompanied by spouse. ).     Review of Systems:  No other skin or systemic complaints other than what is documented elsewhere in the note.    The following portions of the chart were reviewed this encounter and updated as appropriate:   Tobacco  Allergies  Meds  Problems  Med Hx  Surg Hx  Fam Hx         Skin Cancer History  No skin cancer on file.      Specialty Problems          Dermatology Problems    Lipoma of skin and subcutaneous tissue        Objective   Well appearing patient in no apparent distress; mood and affect are within normal limits.    A focused skin examination was performed. All findings within normal limits unless otherwise noted below.    Assessment/Plan   1. Neoplasm of uncertain behavior of skin  Right Nasal Sidewall  Erythematous plaque with central hemorrhagic crusting              Shave removal    Lesion diameter (cm):  1.1  Informed consent: discussed and consent obtained    Timeout: patient name, date of birth, surgical site, and procedure verified    Procedure prep:  Patient was prepped and draped  Anesthesia: the lesion was anesthetized in a standard fashion    Anesthetic:  1% lidocaine w/ epinephrine 1-100,000 local infiltration  Instrument used: DermaBlade    Hemostasis achieved with: aluminum chloride    Outcome: patient tolerated procedure well    Post-procedure details: sterile dressing applied and wound care instructions given    Dressing type: bandage and petrolatum      Staff Communication: Dermatology Local Anesthesia: Site Location: R nasal sidewall 1 % Lidocaine / Epinephrine - Amount: 0.2cc    Specimen 1 - Dermatopathology- DERM LAB  Differential Diagnosis: r/o BCC  Check Margins Yes/No?:    Comments:    Dermpath Lab: Routine Histopathology (formalin-fixed tissue)    2. Lentigo  Head - Anterior (Face)  Tan macules    -Benign appearing on  exam  -Reassurance, recommend observation    3. Seborrheic keratosis  Head - Anterior (Face)  Stuck on, waxy macule(s)/papule(s)/plaque(s) with comedo-like openings and milia like cysts    -Discussed the nature of the diagnosis  -Reassurance, recommend continued observation        Follow up as desired. Patient declines FSE.  Discussed if there are any changes or development of concerning symptoms (lesion/skin condition is changing, bleeding, enlarging, or worsening) the patient is to contact my office. The patient verbalizes understanding.    Jyoti Luong MD  7/3/2024

## 2024-07-09 DIAGNOSIS — C44.311 BASAL CELL CARCINOMA (BCC) OF SKIN OF NOSE: Primary | ICD-10-CM

## 2024-07-09 NOTE — RESULT ENCOUNTER NOTE
Pt was contacted and notified of malignant results at this time.  Pt is agreeable to treatment plan. No further questions noted.       Bola Contreras LPN

## 2024-08-21 ENCOUNTER — APPOINTMENT (OUTPATIENT)
Dept: DERMATOLOGY | Facility: CLINIC | Age: 69
End: 2024-08-21
Payer: COMMERCIAL

## 2024-08-22 ENCOUNTER — APPOINTMENT (OUTPATIENT)
Dept: DERMATOLOGY | Facility: CLINIC | Age: 69
End: 2024-08-22
Payer: COMMERCIAL

## 2024-08-22 VITALS — SYSTOLIC BLOOD PRESSURE: 135 MMHG | HEART RATE: 70 BPM | DIASTOLIC BLOOD PRESSURE: 86 MMHG

## 2024-08-22 DIAGNOSIS — E08.00 DIABETES MELLITUS DUE TO UNDERLYING CONDITION WITH HYPEROSMOLARITY WITHOUT COMA, WITHOUT LONG-TERM CURRENT USE OF INSULIN (MULTI): ICD-10-CM

## 2024-08-22 DIAGNOSIS — C44.311 BASAL CELL CARCINOMA (BCC) OF SKIN OF NOSE: ICD-10-CM

## 2024-08-22 DIAGNOSIS — G89.4 CHRONIC PAIN SYNDROME: ICD-10-CM

## 2024-08-22 NOTE — PROGRESS NOTES
Mohs Surgery Operative Note    Date of Surgery:  8/22/2024  Surgeon:  Warren Jackson MD  Office Location: [facility] [address] [address]  Dept: [phone]  Dept Fax: [phone]  Referring Provider: Jyoti Luong MD  [address] [place] [id number]      Assessment/Plan   Pre-procedure:   Obtained informed consent: written from patient  The surgical site was identified and confirmed with the patient.     Intra-operative:   Audible time out called at : 08:48 AM 08/22/24  by: ROSALIE MCNAMARA RN   Verified patient name, birthdate, site, specimen bottle label & requisition.    The planned procedure(s) was again reviewed with the patient. The risks of bleeding, infection, nerve damage and scarring were reviewed. Written authorization was obtained. The patient identity, surgical site, and planned procedure(s) were verified. The provider acted as both surgeon and pathologist.     Basal cell carcinoma (BCC) of skin of nose  Right Nasal Sidewall    Mohs surgery    Consent obtained: written    Universal Protocol:  Procedure explained and questions answered to patient or proxy's satisfaction: Yes    Test results available and properly labeled: Yes    Pathology report reviewed: Yes    External notes reviewed: Yes    Photo or diagram used for site identification: Yes    Site/side marked: Yes    Slide independently reviewed by Mohs surgeon: Yes    Immediately prior to procedure a time out was called: Yes    Patient identity confirmed: verbally with patient  Preparation: Patient was prepped and draped in usual sterile fashion      Anticoagulation:  Is the patient taking prescription anticoagulant and/or aspirin prescribed/recommended by a physician? No    Was the anticoagulation regimen changed prior to Mohs? No      Anesthesia:  Anesthesia method: local infiltration  Local anesthetic: lidocaine 1% WITH epi    Procedure Details:  Case ID  Number: [id number]  Biopsy accession number: [id number]  Date of biopsy: 7/3/2024  Pre-Op diagnosis: basal cell carcinoma  BCC subtype: infiltrative  Surgery side: right  Surgical site (from skin exam): Right Nasal Sidewall  Pre-operative length (cm): 1.2  Pre-operative width (cm): 1.4  Indications for Mohs surgery: anatomic location where tissue conservation is critical and aggressive histology    Micrographic Surgery Details:  Post-operative length (cm): 2.6  Post-operative width (cm): 2.3  Number of Mohs stages: 5    Stage 1     Comments: The patient was brought into the operating room and placed in the procedure chair in the appropriate position.  The area positive by previous biopsy was identified and confirmed with the patient. The area of clinically obvious tumor was debulked using a curette and/or scalpel as needed. An incision was made following the Mohs approach through the skin. The specimen was taken to the lab, divided into 2 piece(s) and appropriately chromacoded and processed.  Tumor was seen on the lateral margins as indicated on the on the Mohs map.  Infiltrative basal cell carcinoma. Histologic examination revealed thin strands and small-angulated aggregates of atypical basaloid cells.       Tumor features identified on Mohs section: basal carcinoma      Depth of invasion: dermis    Stage 2     Comments: The patient was brought into the operating room and placed in the procedure chair in the appropriate position.  The area positive by previous biopsy was identified and confirmed with the patient. The area of clinically obvious tumor was debulked using a curette and/or scalpel as needed. An incision was made following the Mohs approach through the skin. The specimen was taken to the lab, divided into 2 piece(s) and appropriately chromacoded and processed.    Tumor was seen on the lateral and deep margins as indicated on the on the Mohs map.  Infiltrative basal cell carcinoma. Histologic examination  revealed thin strands and small-angulated aggregates of atypical basaloid cells.           Tumor features identified on Mohs section: basal carcinoma     Depth of invasion: skeletal muscle    Stage 3     Comments: The patient was brought into the operating room and placed in the procedure chair in the appropriate position.  The area positive by previous biopsy was identified and confirmed with the patient. The area of clinically obvious tumor was debulked using a curette and/or scalpel as needed. An incision was made following the Mohs approach through the skin. The specimen was taken to the lab, divided into 2 piece(s) and appropriately chromacoded and processed.    Tumor was seen on the lateral margins as indicated on the on the Mohs map.  Infiltrative basal cell carcinoma. Histologic examination revealed thin strands and small-angulated aggregates of atypical basaloid cells.                   Tumor features identified on Mohs section: basal carcinoma     Depth of invasion: dermis    Stage 4     Comments: The patient was brought into the operating room and placed in the procedure chair in the appropriate position.  The area positive by previous biopsy was identified and confirmed with the patient. The area of clinically obvious tumor was debulked using a curette and/or scalpel as needed. An incision was made following the Mohs approach through the skin. The specimen was taken to the lab, divided into 1 piece(s) and appropriately chromacoded and processed.    Tumor was seen on the lateral margins as indicated on the on the Mohs map.  Infiltrative basal cell carcinoma. Histologic examination revealed thin strands and small-angulated aggregates of atypical basaloid cells.                         Tumor features identified on Mohs section: basal carcinoma     Depth of invasion: dermis    Stage 5     Comments: The area of positivity as noted on the Mohs map in the previous stage was identified and removed using the Mohs  technique. The specimen was taken to the lab and appropriately chromacoded and processed in 1 piece(s).               Tumor features identified on Mohs section: no tumor identified    Depth of defect: skeletal muscle    Patient tolerance of procedure: tolerated well, no immediate complications    Reconstruction:  Was the defect reconstructed? Yes    Was reconstruction performed by the same Mohs surgeon? Yes    Setting of reconstruction: outpatient office  When was reconstruction performed? same day  Type of reconstruction: graft and flap  Type of flap: advancement    Advancement flap type: unilateral single arm  Area of flap (cm2): 3.3.  Graft type: full thickness  Graft area (cm2): 2.3.  Subcutaneous Layers (Deep Stitches)   Suture size:  5-0  Suture type:  Vicryl  Stitches:  Buried vertical mattress  Fine/surface layer approximation (top stitches)   Epidermal/Superficial suture size:  5-0  Epidermal/Superficial suture type:  Prolene and fast-absorbing gut  Stitches: simple interrupted and simple running    Suture removal (days):  7  Hemostasis achieved with: electrodesiccation  Outcome: patient tolerated procedure well with no complications    Post-procedure details: sterile dressing applied and wound care instructions given    Dressing type: pressure dressing, Xeroform gauze, Gelfoam, Telfa pad and Hypafix      Staff Communication: Dermatology Local Anesthesia: 1 % Lidocaine / Epinephrine - Amount:25cc      Reconstruction Details:   The defect involved the medial cheek, nasal sidewall, nasal ala and nasal dorsum. After discussion of the repair options, the decision was made to repair the medial cheek and nasal sidewall with an advancement flap, repair the nasal dorsum with a full thickness skin graft and allow the nasal ala to heal by second intention.     Advancement Flap:  Due to geometric and functional constraints, a flap reconstruction was performed to reconstruct the defect on the medial cheek and nasal  sidewall. To that end, adjacent tissue was incised and carried over to close the defect in the following manner: Advancement flap Using skin marking ink, an advancement flap was designed to repair the defect and minimize functional and cosmetic distortion. Given the size, depth, and location of the defect, the surrounding structures and local tissue laxity, it was felt that an advancement flap was necessary to provide the best restoration of normal anatomy and function of the skin. The risks, benefits and likely outcome of the flap were discussed. The wound edges were refreshed to a 90 degree angle. The flap was cut and undermined extensively at the level of the subcutaneous plane. Standing cutaneous cones were removed using Burow's triangles. The deep tissue was elevated and the flap was advanced into position to close the primary defect using multiple key deep sutures. The remainder of the flap was then affixed with epidermal sutures. The flap measured 3.3 cm2.     Full Thickness Skin Graft:  A full thickness skin graft was designed to repair the defect on the nasal dorsum and minimize functional and cosmetic distortion. The wound edges were refreshed to a 90-degree angle. Hemostassis was obtained by electrocautery. The margins of the defect were minimally undermined in all directions. Potential donor sites were evaluated for texture and skin color to achieve the best match. The right cheek was chosen as the donor site. The graft was then harvested, defatted and washed in saline. After hemostasis was obtained, the donor site was undermined in all directions and closed with two layers of sutures. The graft was removed from the saline bath and defatted with an iris scissor. It was placed into the recipient bed, trimmed to fit and sutured into place with multiple interrupted sutures. The final measurement of the graft is 2.3 cm2.       The remaining defect on the right nasal ala, approximately 0.4 x 0.8 cm was  allowed to heal by second intention.         Wound care was discussed, and the patient was given written post-operative wound care instructions.      The patient will follow up with Warren Jackson MD as needed for any post operative problems or concerns, and will follow up with their primary dermatologist as scheduled.

## 2024-08-22 NOTE — PROGRESS NOTES
Office Visit Note  Date: 8/22/2024  Surgeon:  Warren Jackson MD  Office Location: [facility] [address] [address]  Dept: [phone]  Dept Fax: [phone]  Referring Provider: Jyoti Luong MD  [address] [place] [address]   Casey Raya is a 69 y.o. male who presents for the following: MOHS Surgery    According to the patient, the lesion has been present for approximately greater than 1 year at the time of diagnosis.  The lesion is itchy and painful.  The lesion has not been treated previously.    The patient does not have a pacemaker / defibrillator.  The patient does not have a heart valve / joint replacement.    The patient is not on blood thinners.  The patient does not have a history of hepatitis B or C.  The patient does not have a history of HIV.  The patient does not have a history of immunosuppression (e.g. organ transplantation, malignancy, medications)    Review of Systems:  No other skin or systemic complaints other than what is documented elsewhere in the note.    MEDICAL HISTORY: clinically relevant history including significant past medical history, medications and allergies was reviewed and documented in Epic.    Objective   Well appearing patient in no apparent distress; mood and affect are within normal limits.  Vital signs: See record.  Noted on the Right Nasal Sidewall  Is a 1.2 x 1.4 cm scar      The patient confirmed the identified site.    Discussion:  The nature of the diagnosis was explained. The lesion is a skin cancer.  It has a risk of local growth and distant spread. The condition is associated with sun exposure.  Warning signs of non-melanoma skin cancer discussed. Patient was instructed to perform monthly self skin examination.  We recommended that the patient have regular full skin exams given an increased risk of subsequent skin cancers. The patient was instructed to use sun  protective behaviors including use of broad spectrum sunscreens and sun protective clothing to reduce risk of skin cancers.      Risks, benefits, side effects of Mohs surgery were discussed with patient and the patient voiced understanding.  It was explained that even though the cure rate of Mohs is very high it is not 100%. Risks of surgery including but not limited to bleeding, infection, numbness, nerve damage, and scar were reviewed.  Discussion included wound care requirements, activity restrictions, likely scar outcome and time to heal.     After Mohs surgery, the defect may need to be repaired surgically and the scar may be longer than the original lesion.  Reconstruction options, risks, and benefits were reviewed including second intention healing, linear repair (4-1 ratio was explained), local flaps, skin grafts, cartilage grafts and interpolation flaps (the need for multiple surgeries was explained). Possible outcomes were reviewed including likely scar appearance, failure of flap survival, infection, bleeding and the need for revision surgery.     The pathology was reviewed, the photograph was reviewed, and the referring physician's note was reviewed.    Patient elected for Mohs surgery.

## 2024-08-23 ENCOUNTER — TELEPHONE VISIT (OUTPATIENT)
Dept: DERMATOLOGY | Facility: CLINIC | Age: 69
End: 2024-08-23
Payer: COMMERCIAL

## 2024-08-23 ENCOUNTER — DOCUMENTATION (OUTPATIENT)
Dept: DERMATOLOGY | Facility: HOSPITAL | Age: 69
End: 2024-08-23
Payer: COMMERCIAL

## 2024-08-23 DIAGNOSIS — S01.20XA OPEN WOUND OF NOSE WITHOUT COMPLICATION, INITIAL ENCOUNTER: Primary | ICD-10-CM

## 2024-08-23 DIAGNOSIS — E08.00 DIABETES MELLITUS DUE TO UNDERLYING CONDITION WITH HYPEROSMOLARITY WITHOUT COMA, WITHOUT LONG-TERM CURRENT USE OF INSULIN (MULTI): Primary | ICD-10-CM

## 2024-08-23 DIAGNOSIS — E08.00 DIABETES MELLITUS DUE TO UNDERLYING CONDITION WITH HYPEROSMOLARITY WITHOUT COMA, WITHOUT LONG-TERM CURRENT USE OF INSULIN (MULTI): ICD-10-CM

## 2024-08-23 PROCEDURE — 99024 POSTOP FOLLOW-UP VISIT: CPT | Performed by: STUDENT IN AN ORGANIZED HEALTH CARE EDUCATION/TRAINING PROGRAM

## 2024-08-23 RX ORDER — CEPHALEXIN 500 MG/1
500 CAPSULE ORAL 2 TIMES DAILY
Qty: 28 CAPSULE | Refills: 0 | Status: SHIPPED | OUTPATIENT
Start: 2024-08-23 | End: 2024-09-06

## 2024-08-23 RX ORDER — BLOOD-GLUCOSE SENSOR
EACH MISCELLANEOUS
Refills: 3 | OUTPATIENT
Start: 2024-08-23

## 2024-08-23 RX ORDER — BLOOD-GLUCOSE SENSOR
EACH MISCELLANEOUS
Qty: 9 EACH | Refills: 3 | Status: SHIPPED | OUTPATIENT
Start: 2024-08-23 | End: 2024-08-23 | Stop reason: WASHOUT

## 2024-08-23 RX ORDER — BLOOD-GLUCOSE SENSOR
EACH MISCELLANEOUS
Qty: 6 EACH | Refills: 3 | Status: SHIPPED | OUTPATIENT
Start: 2024-08-23

## 2024-08-23 RX ORDER — GABAPENTIN 600 MG/1
600 TABLET ORAL 2 TIMES DAILY
Qty: 180 TABLET | Refills: 3 | Status: SHIPPED | OUTPATIENT
Start: 2024-08-23 | End: 2025-08-23

## 2024-08-23 ASSESSMENT — DERMATOLOGY QUALITY OF LIFE (QOL) ASSESSMENT
RATE HOW EMOTIONALLY BOTHERED YOU ARE BY YOUR SKIN PROBLEM (FOR EXAMPLE, WORRY, EMBARRASSMENT, FRUSTRATION): 0 - NEVER BOTHERED
RATE HOW BOTHERED YOU ARE BY EFFECTS OF YOUR SKIN PROBLEMS ON YOUR ACTIVITIES (EG, GOING OUT, ACCOMPLISHING WHAT YOU WANT, WORK ACTIVITIES OR YOUR RELATIONSHIPS WITH OTHERS): 0 - NEVER BOTHERED
RATE HOW EMOTIONALLY BOTHERED YOU ARE BY YOUR SKIN PROBLEM (FOR EXAMPLE, WORRY, EMBARRASSMENT, FRUSTRATION): 0 - NEVER BOTHERED
WHAT SINGLE SKIN CONDITION LISTED BELOW IS THE PATIENT ANSWERING THE QUALITY-OF-LIFE ASSESSMENT QUESTIONS ABOUT: NONE OF THE ABOVE
DATE THE QUALITY-OF-LIFE ASSESSMENT WAS COMPLETED: 67081
RATE HOW EMOTIONALLY BOTHERED YOU ARE BY YOUR SKIN PROBLEM (FOR EXAMPLE, WORRY, EMBARRASSMENT, FRUSTRATION): 0 - NEVER BOTHERED
RATE HOW EMOTIONALLY BOTHERED YOU ARE BY YOUR SKIN PROBLEM (FOR EXAMPLE, WORRY, EMBARRASSMENT, FRUSTRATION): 0 - NEVER BOTHERED
DATE THE QUALITY-OF-LIFE ASSESSMENT WAS COMPLETED: 67081
WHAT SINGLE SKIN CONDITION LISTED BELOW IS THE PATIENT ANSWERING THE QUALITY-OF-LIFE ASSESSMENT QUESTIONS ABOUT: NONE OF THE ABOVE
RATE HOW BOTHERED YOU ARE BY SYMPTOMS OF YOUR SKIN PROBLEM (EG, ITCHING, STINGING BURNING, HURTING OR SKIN IRRITATION): 0 - NEVER BOTHERED
RATE HOW BOTHERED YOU ARE BY EFFECTS OF YOUR SKIN PROBLEMS ON YOUR ACTIVITIES (EG, GOING OUT, ACCOMPLISHING WHAT YOU WANT, WORK ACTIVITIES OR YOUR RELATIONSHIPS WITH OTHERS): 0 - NEVER BOTHERED
RATE HOW BOTHERED YOU ARE BY EFFECTS OF YOUR SKIN PROBLEMS ON YOUR ACTIVITIES (EG, GOING OUT, ACCOMPLISHING WHAT YOU WANT, WORK ACTIVITIES OR YOUR RELATIONSHIPS WITH OTHERS): 0 - NEVER BOTHERED
WHAT SINGLE SKIN CONDITION LISTED BELOW IS THE PATIENT ANSWERING THE QUALITY-OF-LIFE ASSESSMENT QUESTIONS ABOUT: NONE OF THE ABOVE
RATE HOW BOTHERED YOU ARE BY SYMPTOMS OF YOUR SKIN PROBLEM (EG, ITCHING, STINGING BURNING, HURTING OR SKIN IRRITATION): 0 - NEVER BOTHERED
WHAT SINGLE SKIN CONDITION LISTED BELOW IS THE PATIENT ANSWERING THE QUALITY-OF-LIFE ASSESSMENT QUESTIONS ABOUT: NONE OF THE ABOVE
RATE HOW BOTHERED YOU ARE BY EFFECTS OF YOUR SKIN PROBLEMS ON YOUR ACTIVITIES (EG, GOING OUT, ACCOMPLISHING WHAT YOU WANT, WORK ACTIVITIES OR YOUR RELATIONSHIPS WITH OTHERS): 0 - NEVER BOTHERED
RATE HOW BOTHERED YOU ARE BY SYMPTOMS OF YOUR SKIN PROBLEM (EG, ITCHING, STINGING BURNING, HURTING OR SKIN IRRITATION): 0 - NEVER BOTHERED
RATE HOW BOTHERED YOU ARE BY SYMPTOMS OF YOUR SKIN PROBLEM (EG, ITCHING, STINGING BURNING, HURTING OR SKIN IRRITATION): 0 - NEVER BOTHERED

## 2024-08-23 ASSESSMENT — PATIENT GLOBAL ASSESSMENT (PGA)
WHAT IS THE PGA: PATIENT GLOBAL ASSESSMENT:  1 - CLEAR
WHAT IS THE PGA: PATIENT GLOBAL ASSESSMENT:  1 - CLEAR

## 2024-08-23 NOTE — PROGRESS NOTES
Received call from patient on 8/22/2024 at 6 PM.    Patient had Mohs micrographic surgery with Dr. Jackson on 8/23/2024 for a basal cell carcinoma on the right nasal sidewall.    Patient reported experiencing severe pain over the past one to two hours. He took Tylenol 1000mg at 5PM, followed by Naproxen 660mg at 530PM. He reported persistent severe pain. Denied rapidly expanding swelling around the surgical closure, although he had noticed swelling near his upper lip and under his eye. Denied bleeding from the suture lines.      Advised patient that the medication had likely not taken full effect yet and the pain should improve over the next hour. Advised patient to continue to apply ice for 10-15 minutes per hour.    Discussed with Dr. Jackson and Mohs fellow, Dr. Bustamante.     Advised patient to keep his head elevated as to prevent increased swelling which can worsen pain. Advised patient that a prescription for Tramadol would be called in to the patient's pharmacy. He should also continue with Tylenol and NSAID alternating every 4 hours to keep up with pain. Advised patient to adhere to maximum dosage per the bottle (1250mg for Naproxen and 3000mg for Acetaminophen).

## 2024-08-23 NOTE — PROGRESS NOTES
Called patient to check on postoperative status. He reports his pain improved significantly around 7 pm last night and he has not had to take the tramadol. He endorses some swelling around his eyelid and lip but no visible bleeding. He was advised to also start Keflex 500 mg BID for 14 days for infection prophylaxis. The patient denied allergies to antibiotics and expressed his understanding of the plan. He will follow up next week.   
No

## 2024-08-27 ENCOUNTER — APPOINTMENT (OUTPATIENT)
Dept: DERMATOLOGY | Facility: CLINIC | Age: 69
End: 2024-08-27
Payer: COMMERCIAL

## 2024-08-27 DIAGNOSIS — S01.20XA: ICD-10-CM

## 2024-08-27 PROCEDURE — 3051F HG A1C>EQUAL 7.0%<8.0%: CPT | Performed by: STUDENT IN AN ORGANIZED HEALTH CARE EDUCATION/TRAINING PROGRAM

## 2024-08-27 PROCEDURE — 3048F LDL-C <100 MG/DL: CPT | Performed by: STUDENT IN AN ORGANIZED HEALTH CARE EDUCATION/TRAINING PROGRAM

## 2024-08-27 PROCEDURE — 3062F POS MACROALBUMINURIA REV: CPT | Performed by: STUDENT IN AN ORGANIZED HEALTH CARE EDUCATION/TRAINING PROGRAM

## 2024-08-27 PROCEDURE — 99024 POSTOP FOLLOW-UP VISIT: CPT | Performed by: STUDENT IN AN ORGANIZED HEALTH CARE EDUCATION/TRAINING PROGRAM

## 2024-08-27 NOTE — PROGRESS NOTES
Office Follow Up Note    Visit Summary  Chief Complaint    1. Complaint Wound check.    Casey Raya is a 69 y.o. male who presents for 5 day follow up after surgery for a basal cell carcinoma. The wound was repaired with an advancement flap and burows graft. The patient has no concerns today.     Location Operation site location: right nasal sidewall    On exam,  Mr. Raya is well-appearing and in no apparent distress. The surgical site appears clean with minimal to no erythema. The suture line along the nasal sidewall and medial cheek is healing well with good wound apposition. After the bolster was removed, the graft on the nose was in place with sutures intact. There is venous congestion in the graft.     Assessment and Plan:  History of skin cancer requiring ongoing monitoring for recurrence and additional lesion development.   The patient was reassured that the wound is healing appropriately. He was advised to continue to apply vaseline and cover the wound particularly over the graft daily to promote healing.     The patient was advised on the importance of routine skin monitoring including follow up with general dermatology and instructed to call with any further concerns. The patient will return on Friday as scheduled for suture removal.

## 2024-08-29 ENCOUNTER — APPOINTMENT (OUTPATIENT)
Dept: DERMATOLOGY | Facility: CLINIC | Age: 69
End: 2024-08-29
Payer: COMMERCIAL

## 2024-08-30 ENCOUNTER — APPOINTMENT (OUTPATIENT)
Dept: DERMATOLOGY | Facility: CLINIC | Age: 69
End: 2024-08-30
Payer: COMMERCIAL

## 2024-08-30 DIAGNOSIS — S01.20XS: ICD-10-CM

## 2024-08-30 NOTE — PROGRESS NOTES
Office Follow Up Note    Visit Summary  Chief Complaint    1. Complaint Wound check.    Casey Raya is a 69 y.o. male who presents for 1 week follow up after surgery for a basal cell carcinoma. The wound was repaired with an advancement flap and burows graft. The patient has no concerns today.     Location Operation site location: right nasal sidewall    On exam,  Mr. Raya is well-appearing and in no apparent distress. T. The surgical site appears clean with minimal to no erythema. The suture line along the nasal sidewall and medial cheek is healing well with good wound apposition and sutures intact. The graft on the nose remains in place and appears pink-purple.     Assessment and Plan:  History of skin cancer requiring ongoing monitoring for recurrence and additional lesion development.   The patient was reassured that the wound is healing appropriately. He was advised to continue to apply vaseline and cover the wound particularly over the graft daily to promote healing.   Prolene sutures were removed without complication today.  The dressing was removed, the wound cleaned a a new dressing reapplied.     The patient was advised on the importance of routine skin monitoring including follow up with general dermatology and instructed to call with any further concerns. The patient will return in 2 weeks.

## 2024-09-07 ASSESSMENT — DERMATOLOGY QUALITY OF LIFE (QOL) ASSESSMENT
RATE HOW EMOTIONALLY BOTHERED YOU ARE BY YOUR SKIN PROBLEM (FOR EXAMPLE, WORRY, EMBARRASSMENT, FRUSTRATION): 2
RATE HOW BOTHERED YOU ARE BY EFFECTS OF YOUR SKIN PROBLEMS ON YOUR ACTIVITIES (EG, GOING OUT, ACCOMPLISHING WHAT YOU WANT, WORK ACTIVITIES OR YOUR RELATIONSHIPS WITH OTHERS): 3
WHAT SINGLE SKIN CONDITION LISTED BELOW IS THE PATIENT ANSWERING THE QUALITY-OF-LIFE ASSESSMENT QUESTIONS ABOUT: NONE OF THE ABOVE
RATE HOW BOTHERED YOU ARE BY SYMPTOMS OF YOUR SKIN PROBLEM (EG, ITCHING, STINGING BURNING, HURTING OR SKIN IRRITATION): 5
RATE HOW BOTHERED YOU ARE BY EFFECTS OF YOUR SKIN PROBLEMS ON YOUR ACTIVITIES (EG, GOING OUT, ACCOMPLISHING WHAT YOU WANT, WORK ACTIVITIES OR YOUR RELATIONSHIPS WITH OTHERS): 3
RATE HOW EMOTIONALLY BOTHERED YOU ARE BY YOUR SKIN PROBLEM (FOR EXAMPLE, WORRY, EMBARRASSMENT, FRUSTRATION): 2
DATE THE QUALITY-OF-LIFE ASSESSMENT WAS COMPLETED: 67090
RATE HOW BOTHERED YOU ARE BY SYMPTOMS OF YOUR SKIN PROBLEM (EG, ITCHING, STINGING BURNING, HURTING OR SKIN IRRITATION): 5
WHAT SINGLE SKIN CONDITION LISTED BELOW IS THE PATIENT ANSWERING THE QUALITY-OF-LIFE ASSESSMENT QUESTIONS ABOUT: NONE OF THE ABOVE

## 2024-09-09 ENCOUNTER — APPOINTMENT (OUTPATIENT)
Dept: DERMATOLOGY | Facility: CLINIC | Age: 69
End: 2024-09-09
Payer: COMMERCIAL

## 2024-09-09 DIAGNOSIS — S01.20XS: ICD-10-CM

## 2024-09-09 PROCEDURE — 3062F POS MACROALBUMINURIA REV: CPT | Performed by: STUDENT IN AN ORGANIZED HEALTH CARE EDUCATION/TRAINING PROGRAM

## 2024-09-09 PROCEDURE — 3048F LDL-C <100 MG/DL: CPT | Performed by: STUDENT IN AN ORGANIZED HEALTH CARE EDUCATION/TRAINING PROGRAM

## 2024-09-09 PROCEDURE — 99024 POSTOP FOLLOW-UP VISIT: CPT | Performed by: STUDENT IN AN ORGANIZED HEALTH CARE EDUCATION/TRAINING PROGRAM

## 2024-09-09 PROCEDURE — 3051F HG A1C>EQUAL 7.0%<8.0%: CPT | Performed by: STUDENT IN AN ORGANIZED HEALTH CARE EDUCATION/TRAINING PROGRAM

## 2024-09-09 NOTE — PROGRESS NOTES
Office Follow Up Note    Visit Summary  Chief Complaint    1. Complaint Wound check.    Casey Raya is a 69 y.o. male who presents for 2.5 week follow up after surgery for a basal cell carcinoma. The patient has no concerns today.     Location Operation site location: Right Nasal Sidewall    On exam,  Mr. Raya is well-appearing and in no apparent distress. The surgical site appears clean with minimal to no erythema. The flap incision lines appear to be well healed. The graft on the nose is pink with slight crusting along the suture line but no tenderness or drainage.     Assessment and Plan:  History of skin cancer requiring ongoing monitoring for recurrence and additional lesion development.   The patient was reassured that the wound is healing appropriately.   The dressing was removed, the wound cleaned a a new dressing reapplied.     The patient was advised on the importance of routine skin monitoring including follow up with general dermatology and instructed to call with any further concerns. The patient will return in as needed, and will send a photo to our dermvirtualvisit email in 2 weeks.

## 2024-09-16 ENCOUNTER — APPOINTMENT (OUTPATIENT)
Dept: PRIMARY CARE | Facility: CLINIC | Age: 69
End: 2024-09-16
Payer: COMMERCIAL

## 2024-09-16 VITALS
OXYGEN SATURATION: 97 % | BODY MASS INDEX: 27.06 KG/M2 | WEIGHT: 178 LBS | DIASTOLIC BLOOD PRESSURE: 78 MMHG | SYSTOLIC BLOOD PRESSURE: 124 MMHG | HEART RATE: 66 BPM

## 2024-09-16 DIAGNOSIS — N40.1 BENIGN PROSTATIC HYPERPLASIA WITH LOWER URINARY TRACT SYMPTOMS, SYMPTOM DETAILS UNSPECIFIED: ICD-10-CM

## 2024-09-16 DIAGNOSIS — E78.5 HYPERLIPIDEMIA, UNSPECIFIED HYPERLIPIDEMIA TYPE: ICD-10-CM

## 2024-09-16 DIAGNOSIS — E08.00 DIABETES MELLITUS DUE TO UNDERLYING CONDITION WITH HYPEROSMOLARITY WITHOUT COMA, WITHOUT LONG-TERM CURRENT USE OF INSULIN: ICD-10-CM

## 2024-09-16 DIAGNOSIS — C44.311 BASAL CELL CARCINOMA (BCC) OF RIGHT SIDE OF NOSE: ICD-10-CM

## 2024-09-16 DIAGNOSIS — Z12.83 SCREENING EXAM FOR SKIN CANCER: ICD-10-CM

## 2024-09-16 DIAGNOSIS — I10 PRIMARY HYPERTENSION: ICD-10-CM

## 2024-09-16 DIAGNOSIS — E55.9 AVITAMINOSIS D: ICD-10-CM

## 2024-09-16 DIAGNOSIS — Z00.00 ROUTINE GENERAL MEDICAL EXAMINATION AT HEALTH CARE FACILITY: Primary | ICD-10-CM

## 2024-09-16 LAB — POC HEMOGLOBIN A1C: 6 % (ref 4.2–6.5)

## 2024-09-16 PROCEDURE — 3048F LDL-C <100 MG/DL: CPT | Performed by: FAMILY MEDICINE

## 2024-09-16 PROCEDURE — G0439 PPPS, SUBSEQ VISIT: HCPCS | Performed by: FAMILY MEDICINE

## 2024-09-16 PROCEDURE — 1170F FXNL STATUS ASSESSED: CPT | Performed by: FAMILY MEDICINE

## 2024-09-16 PROCEDURE — 83036 HEMOGLOBIN GLYCOSYLATED A1C: CPT | Performed by: FAMILY MEDICINE

## 2024-09-16 PROCEDURE — 3074F SYST BP LT 130 MM HG: CPT | Performed by: FAMILY MEDICINE

## 2024-09-16 PROCEDURE — 1159F MED LIST DOCD IN RCRD: CPT | Performed by: FAMILY MEDICINE

## 2024-09-16 PROCEDURE — 1160F RVW MEDS BY RX/DR IN RCRD: CPT | Performed by: FAMILY MEDICINE

## 2024-09-16 PROCEDURE — 3078F DIAST BP <80 MM HG: CPT | Performed by: FAMILY MEDICINE

## 2024-09-16 PROCEDURE — 99214 OFFICE O/P EST MOD 30 MIN: CPT | Performed by: FAMILY MEDICINE

## 2024-09-16 PROCEDURE — 1036F TOBACCO NON-USER: CPT | Performed by: FAMILY MEDICINE

## 2024-09-16 PROCEDURE — 3062F POS MACROALBUMINURIA REV: CPT | Performed by: FAMILY MEDICINE

## 2024-09-16 PROCEDURE — 3051F HG A1C>EQUAL 7.0%<8.0%: CPT | Performed by: FAMILY MEDICINE

## 2024-09-16 ASSESSMENT — PATIENT HEALTH QUESTIONNAIRE - PHQ9
1. LITTLE INTEREST OR PLEASURE IN DOING THINGS: NOT AT ALL
SUM OF ALL RESPONSES TO PHQ9 QUESTIONS 1 AND 2: 0
2. FEELING DOWN, DEPRESSED OR HOPELESS: NOT AT ALL

## 2024-09-16 ASSESSMENT — ACTIVITIES OF DAILY LIVING (ADL)
MANAGING_FINANCES: INDEPENDENT
BATHING: INDEPENDENT
DOING_HOUSEWORK: INDEPENDENT
GROCERY_SHOPPING: INDEPENDENT
TAKING_MEDICATION: INDEPENDENT
DRESSING: INDEPENDENT

## 2024-09-16 NOTE — PROGRESS NOTES
Subjective   Reason for Visit: Casey Raya is an 69 y.o. male here for a Medicare Wellness visit.     Past Medical, Surgical, and Family History reviewed and updated in chart.    Reviewed all medications by prescribing practitioner or clinical pharmacist (such as prescriptions, OTCs, herbal therapies and supplements) and documented in the medical record.    [facility] [place]    Patient Care Team:  Ruthie Renee DO as PCP - General (Family Medicine)  Ruthie Renee DO as PCP - [facility] PCP  Ruthie Renee DO     Review of Systems    Objective   Vitals:  /78   Pulse 66   Wt 80.7 kg (178 lb)   SpO2 97%   BMI 27.06 kg/m²       Physical Exam    Assessment & Plan  Diabetes mellitus due to underlying condition with hyperosmolarity without coma, without long-term current use of insulin (Multi)    Orders:  •  POCT glycosylated hemoglobin (Hb A1C) manually resulted  •  semaglutide (OZEMPIC) 1 mg/dose (4 mg/3 mL) pen injector; Inject 1 mg under the skin 1 (one) time per week.  •  CBC and Auto Differential; Future  •  Comprehensive metabolic panel; Future  •  Lipid panel; Future      Screening exam for skin cancer    Orders:  •  Referral to Dermatology      Basal cell carcinoma (BCC) of right side of nose    Orders:  •  Referral to Dermatology      Benign prostatic hyperplasia with lower urinary tract symptoms, symptom details unspecified    Orders:  •  PSA, total and free; Future      Avitaminosis D    Orders:  •  Vitamin D 25-Hydroxy,Total (for eval of Vitamin D levels); Future      Primary hypertension    Orders:  •  Tsh With Reflex To Free T4 If Abnormal; Future      Hyperlipidemia, unspecified hyperlipidemia type    Orders:  •  Lipid panel; Future      Routine general medical examination at health care facility    Orders:  •  1 Year Follow Up In Primary Care - Wellness Exam; Future

## 2024-09-16 NOTE — PROGRESS NOTES
"Subjective   Casey Raya is a 69 y.o. male who presents for Medicare Annual Wellness Visit Subsequent (Medicare Wellness/Follow up A1c).  Accompanied by his wife.     1. Chronic neck and Back Pain-   - would like a MRI of the L spine 3/31/22- no major changes to the lumbar spine, still have disease and would recommend seeing an orthopedic spine doc to review and discuss with your sympotms. L4/L5: Left hemilaminotomy (post surgery changes) . Decreased left paracentral protrusion and perineural fibrosis (this is a good thing) . L5/S1: Moderate left foraminal stenosis.  - tried an epidural nerve block which didn’t help   - previous s/p L4/L5 back surgery performed by Vignesh Kevin  - [facility]ita rec trial of PT in April 2022  - increased gabapentin by 200 mg (1400 per day)     #) basal cell carcinoma of the right nose   - Mohs with Dr Jackson on 8/22/24   - would like referred to Dr Willard for full body check     #) trigger finger, - release by Dr Newton in 2022    #) T2DM    3 mo A1C follow up-- 6.3% today (up from 6.2%) --> 7.4% --> 6.1% --> 7.2% --> 6.2%-->  6.0%   - on metformin 1000 mg BID - stopped now that A1c was good and trying to treat the diarrhea   - still some stomach symptoms, will monitor closely with increasing Iozomic dose   - eating small frequent meals, wife says he is getting protein  - staying well hydrated   - was on statin, and stopped because was worried it was making sugars are worse     #)  Pain Management- is better with less activity  - not following with them any longer   - Gabapentin - is helping, cymbalta  - wants some Vicodin for his back until he can see Dr. Kevin    #). Shoulder Pain, thinks it is from his neck- the same/ \"ok\" - both hurt - the same, average pain 5/10  - Dr. High had an appointment and he said that there is nothing wrong with the shoulder structurally according to the patient    #) BPH with LUTS - follows with urology yearly.   - on gemtesa (stopped the myrbetric)   " resolved s/p Urolift 2019 and PVP 11/08/2021   - could not tolerate the SGLT2 due to worsening urinary symptoms      ROS was completed and all systems are negative with the exception of what was noted in the the HPI.     Objective     /78   Pulse 66   Wt 80.7 kg (178 lb)   SpO2 97%   BMI 27.06 kg/m²      Physical Exam  Gen: A+O, NAD  HEENT: NC/AT, EOMI, no LAD, oropharynx clear, no edema or erythema, TM visualized and normal  CV: RRR, No M/R/G  Resp: CTAB, No W/R/C  Abd: Soft, NT/ND  Neuro: PERRL, moves all extremities equally, normal gait   Skin: No rashes, no LE edema or varicosities   MSK: Grossly intact strength and reflexes; normal gait  Psych: Normal mood and affect    Assessment/Plan   Problem List Items Addressed This Visit    None  You are down another 4# since   Please stop the metformin all together   Increase the ozempic from 0.5 to 1.0 mg weekly  If you have any GI effects , let me know so we might be able to switch to Mounjaro.     Continue to follow up with Yanet for follow up as needed     Colon screening in August 2, 2023. Repeat colonoscopy in 10 years for screening purposes.     Labs on 2/2/24-Cholesterol is still elevated too, especially the triglycerides at 257, goal is to get this around 100. Liver and kidney function are normal. Vitamin B12 is in range, normal prostate level. Normal blood counts, no evidence of anemia or infection.     Get updated fasting blood work prior to next visit in February 2024     follow up with urologist as recommended     Continue follow up with your psychiatrist     Continue the cholesterol medications.     Blood pressure looks good 124/78    You are down another 5# which is good with the new diet of lower carbs and increase fruits/veggies     Please get an up dated eye and dental examination   Also check feet regularly.     call if you need refills.     follow up in 5 months for a repeat A1c check or sooner as needed.  Get updated fasting blood work  prior to next appt.     Ruthie Renee DO, MSMed, ABOM  [address]   [address]   [place] [address]  Ph. [phone]  Fx. [phone]

## 2024-09-16 NOTE — PATIENT INSTRUCTIONS
A1c is so much better at 6.0%.     You are down another 4# since   Please stop the metformin all together   Increase the ozempic from 0.5 to 1.0 mg weekly  If you have any GI effects , let me know so we might be able to switch to Mounjaro.     Continue to follow up with Yanet for follow up as needed     Colon screening in August 2, 2023. Repeat colonoscopy in 10 years for screening purposes.     Labs on 2/2/24-Cholesterol is still elevated too, especially the triglycerides at 257, goal is to get this around 100. Liver and kidney function are normal. Vitamin B12 is in range, normal prostate level. Normal blood counts, no evidence of anemia or infection.     Get updated fasting blood work prior to next visit in February 2024     follow up with urologist as recommended     Continue the cholesterol medications.     Blood pressure looks good 124/78    You are down another 5# which is good with the new diet of lower carbs and increase fruits/veggies     Please get an up dated eye and dental examination   Also check feet regularly.     call if you need refills.     follow up in 5 months for a repeat A1c check or sooner as needed.  Get updated fasting blood work prior to next appt.

## 2024-10-01 ENCOUNTER — TELEMEDICINE (OUTPATIENT)
Dept: PHARMACY | Facility: HOSPITAL | Age: 69
End: 2024-10-01
Payer: COMMERCIAL

## 2024-10-01 DIAGNOSIS — E08.00 DIABETES MELLITUS DUE TO UNDERLYING CONDITION WITH HYPEROSMOLARITY WITHOUT COMA, WITHOUT LONG-TERM CURRENT USE OF INSULIN: ICD-10-CM

## 2024-10-01 RX ORDER — BLOOD-GLUCOSE SENSOR
EACH MISCELLANEOUS
Qty: 6 EACH | Refills: 3 | Status: SHIPPED | OUTPATIENT
Start: 2024-10-01

## 2024-10-01 NOTE — Clinical Note
Increased Ozempic to 0.75mg so far tolerating well, increasing to 1mg this week. He will call me if GI s/e develop and discussed Mounjaro if needed to switch. Thank you

## 2024-10-01 NOTE — PROGRESS NOTES
Pharmacist Clinic: Diabetes Management  Casey Raya is a 69 y.o. male was referred to Clinical Pharmacy Team for diabetes management.   Referring Provider: Ruthie Renee DO    Diabetes  He presents for his follow-up diabetic visit. He has type 2 diabetes mellitus. His disease course has been improving. There are no hypoglycemic complications. There are no diabetic complications. Risk factors for coronary artery disease include diabetes mellitus, dyslipidemia, hypertension and male sex. Current diabetic treatments: GLP1a + metformin. He is compliant with treatment all of the time. His weight is decreasing steadily. His home blood glucose trend is decreasing steadily. An ACE inhibitor/angiotensin II receptor blocker is being taken.   Since last visit, repeat A1c down from 6.2 to 6%. At recent PCP visit instructed to stop metformin and increase Ozempic to 1mg weekly. He increased Ozempic dose to 0.75mg last week (using 0.5mg pen - did 1 inj of 0.5mg and 0.25mg) and plans to increase to 1mg dose with new 1mg pen this week. Tolerating well so far denies GI side effects. Blood sugars stable.    Enjoys [place]. Side sleeper - false lows overnight with sensor ; in a FB group for Brandon peer support     Pertinent PMH Review:  - PMH of Pancreatitis: No  - PMH/FH of Medullary Thyroid Cancer: No  - PMH of Retinopathy: No  - PMH of Urinary Tract Infections: No     LAB REVIEW   Lab Results   Component Value Date    LDLCALC 75 02/02/2024    CREATININE 0.59 02/02/2024      Lab Results   Component Value Date    HGBA1C 6.0 09/16/2024    HGBA1C 6.2 05/13/2024    HGBA1C 7.4 (H) 02/02/2024     Lab Results   Component Value Date    TRIG 257 (H) 02/02/2024    TRIG 263 (H) 11/18/2022    TRIG 174 (H) 09/15/2021     The 10-year ASCVD risk score (Jonatan MARISCAL, et al., 2019) is: 28.7%    Values used to calculate the score:      Age: 69 years      Sex: Male      Is Non- : No      Diabetic: Yes      Tobacco smoker:  No      Systolic Blood Pressure: 124 mmHg      Is BP treated: No      HDL Cholesterol: 33.1 mg/dL      Total Cholesterol: 159 mg/dL     DIABETES ASSESSMENT  CURRENT PHARMACOTHERAPY  - Ozempic 0.5mg subcutaneous once weekly (Thursdays)  - Metformin 500mg twice daily     Allergies: Bupropion, Cat dander, Dog dander, Oxycodone-acetaminophen, and Prednisone     SECONDARY PREVENTION  - Statin? Yes   - ACE-I/ARB? Yes    HISTORICAL PHARMACOTHERAPY  - Farxiga (urinary frequency)  - Metformin (mild GI s/e, replaced with Ozempic)  - H/o myaglia with atorvastatin, now on simvastatin with COQ10 and tolerating     SMBG MEASUREMENTS  Patient is using: continuous glucose monitor + glucometer   None > 150 or < 70  Patient does not report symptoms of hypoglycemia.   Patient does not report symptoms of hyperglycemia.     AFFORDABILITY/ADHERENCE   - No cost or adherence barriers identified     Assessment/Plan   Problem List Items Addressed This Visit       Diabetes mellitus (Multi)     Patients diabetes is well controlled with most recent A1c of 6% (Goal < 7%).   Increase Ozempic to 1mg subcutaneous once weekly  Previous mild GI side effects with dose increase. Slow escalation to monitor. Discussed future consideration of Mounjaro if GI side effects occurred (covered, no PA required)    Education Provided to Patient:   - Counseled patient on Ozempic MOA, expectations, side effects, duration of therapy, administration, and monitoring parameters.  - Provided detailed dosing and administration counseling to ensure proper technique.   - Reviewed Ozempic titration schedule, starting with 0.25 mg once weekly for 4 weeks, then continuing on 0.5 mg once weekly. Pt verbalized understanding.  - Counseled patient on the benefits of GLP-1ra, such as cardiovascular risk reduction, glycemic control, and weight loss potential.  - Advised patient that they may experience improved satiety after meals and portion sizes of meals may be reduced as doses  of Ozempic increase.  - Counseled patient to avoid foods that are fatty/oily as this may precipitate the nausea/GI upset that may occur with new start Ozempic.     PharmD Follow-up: 12/10/24 11 AM  PCP Follow-up: 2/17/25    Notify your healthcare provider if you have any of the following:  -Diarrhea or vomiting for more than six hours  -Blood sugar >300 mg/dL more than once  -Low blood sugar (<70 mg/dL)  -Questions about your medications    Thank you,  Yanet Reed, PharmD    Continue all meds under the continuation of care with the referring provider and clinical pharmacy team.  Verbal consent to manage patient's drug therapy was obtained. They were informed they may decline to participate or withdraw from participation in pharmacy services at any time.

## 2024-11-26 ENCOUNTER — APPOINTMENT (OUTPATIENT)
Dept: DERMATOLOGY | Facility: CLINIC | Age: 69
End: 2024-11-26
Payer: COMMERCIAL

## 2024-12-10 ENCOUNTER — APPOINTMENT (OUTPATIENT)
Dept: PHARMACY | Facility: HOSPITAL | Age: 69
End: 2024-12-10
Payer: COMMERCIAL

## 2024-12-10 DIAGNOSIS — E08.00 DIABETES MELLITUS DUE TO UNDERLYING CONDITION WITH HYPEROSMOLARITY WITHOUT COMA, WITHOUT LONG-TERM CURRENT USE OF INSULIN: ICD-10-CM

## 2024-12-10 RX ORDER — TIRZEPATIDE 5 MG/.5ML
5 INJECTION, SOLUTION SUBCUTANEOUS WEEKLY
Qty: 2 ML | Refills: 1 | Status: SHIPPED | OUTPATIENT
Start: 2024-12-10

## 2025-01-07 ENCOUNTER — APPOINTMENT (OUTPATIENT)
Dept: PHARMACY | Facility: HOSPITAL | Age: 70
End: 2025-01-07
Payer: COMMERCIAL

## 2025-01-07 DIAGNOSIS — E08.00 DIABETES MELLITUS DUE TO UNDERLYING CONDITION WITH HYPEROSMOLARITY WITHOUT COMA, WITHOUT LONG-TERM CURRENT USE OF INSULIN: ICD-10-CM

## 2025-01-07 RX ORDER — TIRZEPATIDE 5 MG/.5ML
5 INJECTION, SOLUTION SUBCUTANEOUS WEEKLY
Qty: 6 ML | Refills: 1 | Status: SHIPPED | OUTPATIENT
Start: 2025-01-07

## 2025-01-07 NOTE — PROGRESS NOTES
Pharmacist Clinic: Diabetes Management  Casey Raya is a 69 y.o. male was referred to Clinical Pharmacy Team for diabetes management.   Referring Provider: Ruthie Renee DO    Diabetes  He presents for his follow-up diabetic visit. He has type 2 diabetes mellitus. His disease course has been stable. There are no hypoglycemic complications. There are no diabetic complications. Risk factors for coronary artery disease include diabetes mellitus, dyslipidemia, hypertension and male sex. Current diabetic treatments: GLP1a. He is compliant with treatment all of the time. His weight is stable. There is no change in his home blood glucose trend. An ACE inhibitor/angiotensin II receptor blocker is being taken.   Since last visit, ozempic stopped due to persistent diarrhea and transitioned to mounjaro 5mg weekly. No longer experiencing GI side effects. BG stable. No issues with injection administration or glucose monitoring with CGM.    Pertinent PMH Review:  - PMH of Pancreatitis: No  - PMH/FH of Medullary Thyroid Cancer: No  - PMH of Retinopathy: No  - PMH of Urinary Tract Infections: No     LAB REVIEW   Lab Results   Component Value Date    LDLCALC 75 02/02/2024    CREATININE 0.59 02/02/2024      Lab Results   Component Value Date    HGBA1C 6.0 09/16/2024    HGBA1C 6.2 05/13/2024    HGBA1C 7.4 (H) 02/02/2024     Lab Results   Component Value Date    TRIG 257 (H) 02/02/2024    TRIG 263 (H) 11/18/2022    TRIG 174 (H) 09/15/2021     The 10-year ASCVD risk score (Jonatan MARISCAL, et al., 2019) is: 28.7%    Values used to calculate the score:      Age: 69 years      Sex: Male      Is Non- : No      Diabetic: Yes      Tobacco smoker: No      Systolic Blood Pressure: 124 mmHg      Is BP treated: No      HDL Cholesterol: 33.1 mg/dL      Total Cholesterol: 159 mg/dL     DIABETES ASSESSMENT  CURRENT PHARMACOTHERAPY  - Mounjaro 5mg weekly (Thursdays)    Allergies: Bupropion, Cat dander, Dog dander,  Oxycodone-acetaminophen, and Prednisone     SECONDARY PREVENTION  - Statin? Yes   - ACE-I/ARB? Yes    HISTORICAL PHARMACOTHERAPY  - Farxiga (urinary frequency)  - Metformin (mild GI s/e, replaced with Ozempic)  - Ozempic (diarrhea)  - H/o myaglia with atorvastatin, now on simvastatin with COQ10 and tolerating     SMBG MEASUREMENTS  Patient is using: continuous glucose monitor FS [place] 3 Plus + glucometer    Side sleeper - occasional false lows overnight due to pressure alarm; follows FB group for [place] peer support   None > 150 or < 70  AM -110   Postprandial 150-160  Patient does not report symptoms of hypoglycemia.   Patient does not report symptoms of hyperglycemia.     AFFORDABILITY/ADHERENCE   - No cost or adherence barriers identified     Assessment/Plan   Problem List Items Addressed This Visit       Diabetes mellitus (Multi)     Patients diabetes is well controlled with most recent A1c of 6% (Goal < 7%).   Continue Mounjaro 5mg subcutaneous once weekly  Repeat A1c, lipid panel and urine albumin due next month     Education Provided to Patient:   Counseled patient on Mounjaro MOA, expectations, side effects, duration of therapy, administration, and monitoring parameters.  Provided detailed dosing and administration counseling to ensure proper technique.   Reviewed Mounjaro titration schedule, starting with 2.5 mg once weekly to a goal of 15 mg once weekly if tolerated  Counseled patient on the benefits of GLP-1ra glycemic control and weight loss  Reviewed storage requirements of Mounjaro when not in use, and when to administer the medication if a dose is missed.  Advised patient that they may experience improved satiety after meals and portion sizes of meals may be reduced as doses of Mounjaro increase.     PharmD Follow-up: as needed   PCP Follow-up: 2/17/25    Notify your healthcare provider if you have any of the following:  -Diarrhea or vomiting for more than six hours  -Blood sugar >300 mg/dL more  than once  -Low blood sugar (<70 mg/dL)  -Questions about your medications    Thank you,  Yanet Reed, PharmD    Continue all meds under the continuation of care with the referring provider and clinical pharmacy team.  Verbal consent to manage patient's drug therapy was obtained. They were informed they may decline to participate or withdraw from participation in pharmacy services at any time.

## 2025-01-22 DIAGNOSIS — G89.4 CHRONIC PAIN SYNDROME: ICD-10-CM

## 2025-01-22 RX ORDER — GABAPENTIN 600 MG/1
600 TABLET ORAL 3 TIMES DAILY
Qty: 270 TABLET | Refills: 3 | Status: SHIPPED | OUTPATIENT
Start: 2025-01-22 | End: 2026-01-22

## 2025-02-04 LAB
25(OH)D3+25(OH)D2 SERPL-MCNC: 28 NG/ML (ref 30–100)
ALBUMIN SERPL-MCNC: 4.4 G/DL (ref 3.6–5.1)
ALP SERPL-CCNC: 65 U/L (ref 35–144)
ALT SERPL-CCNC: 25 U/L (ref 9–46)
ANION GAP SERPL CALCULATED.4IONS-SCNC: 10 MMOL/L (CALC) (ref 7–17)
AST SERPL-CCNC: 19 U/L (ref 10–35)
BASOPHILS # BLD AUTO: 42 CELLS/UL (ref 0–200)
BASOPHILS NFR BLD AUTO: 0.8 %
BILIRUB SERPL-MCNC: 0.5 MG/DL (ref 0.2–1.2)
BUN SERPL-MCNC: 19 MG/DL (ref 7–25)
CALCIUM SERPL-MCNC: 9 MG/DL (ref 8.6–10.3)
CHLORIDE SERPL-SCNC: 105 MMOL/L (ref 98–110)
CHOLEST SERPL-MCNC: 139 MG/DL
CHOLEST/HDLC SERPL: 4.5 (CALC)
CO2 SERPL-SCNC: 25 MMOL/L (ref 20–32)
CREAT SERPL-MCNC: 0.56 MG/DL (ref 0.7–1.35)
EGFRCR SERPLBLD CKD-EPI 2021: 107 ML/MIN/1.73M2
EOSINOPHIL # BLD AUTO: 180 CELLS/UL (ref 15–500)
EOSINOPHIL NFR BLD AUTO: 3.4 %
ERYTHROCYTE [DISTWIDTH] IN BLOOD BY AUTOMATED COUNT: 12.9 % (ref 11–15)
GLUCOSE SERPL-MCNC: 134 MG/DL (ref 65–99)
HCT VFR BLD AUTO: 44.6 % (ref 38.5–50)
HDLC SERPL-MCNC: 31 MG/DL
HGB BLD-MCNC: 14.4 G/DL (ref 13.2–17.1)
LDLC SERPL CALC-MCNC: 73 MG/DL (CALC)
LYMPHOCYTES # BLD AUTO: 2136 CELLS/UL (ref 850–3900)
LYMPHOCYTES NFR BLD AUTO: 40.3 %
MCH RBC QN AUTO: 30.6 PG (ref 27–33)
MCHC RBC AUTO-ENTMCNC: 32.3 G/DL (ref 32–36)
MCV RBC AUTO: 94.9 FL (ref 80–100)
MONOCYTES # BLD AUTO: 456 CELLS/UL (ref 200–950)
MONOCYTES NFR BLD AUTO: 8.6 %
NEUTROPHILS # BLD AUTO: 2486 CELLS/UL (ref 1500–7800)
NEUTROPHILS NFR BLD AUTO: 46.9 %
NONHDLC SERPL-MCNC: 108 MG/DL (CALC)
PLATELET # BLD AUTO: 260 THOUSAND/UL (ref 140–400)
PMV BLD REES-ECKER: 10.3 FL (ref 7.5–12.5)
POTASSIUM SERPL-SCNC: 4.2 MMOL/L (ref 3.5–5.3)
PROT SERPL-MCNC: 6.8 G/DL (ref 6.1–8.1)
PSA FREE MFR SERPL: 40 % (CALC)
PSA FREE SERPL-MCNC: 0.2 NG/ML
PSA SERPL-MCNC: 0.5 NG/ML
RBC # BLD AUTO: 4.7 MILLION/UL (ref 4.2–5.8)
SODIUM SERPL-SCNC: 140 MMOL/L (ref 135–146)
TRIGL SERPL-MCNC: 267 MG/DL
TSH SERPL-ACNC: 2.49 MIU/L (ref 0.4–4.5)
WBC # BLD AUTO: 5.3 THOUSAND/UL (ref 3.8–10.8)

## 2025-02-17 ENCOUNTER — APPOINTMENT (OUTPATIENT)
Dept: PRIMARY CARE | Facility: CLINIC | Age: 70
End: 2025-02-17
Payer: COMMERCIAL

## 2025-02-17 VITALS
DIASTOLIC BLOOD PRESSURE: 74 MMHG | OXYGEN SATURATION: 97 % | SYSTOLIC BLOOD PRESSURE: 122 MMHG | HEART RATE: 80 BPM | BODY MASS INDEX: 26.76 KG/M2 | WEIGHT: 176 LBS

## 2025-02-17 DIAGNOSIS — E08.00 DIABETES MELLITUS DUE TO UNDERLYING CONDITION WITH HYPEROSMOLARITY WITHOUT COMA, WITHOUT LONG-TERM CURRENT USE OF INSULIN: Primary | ICD-10-CM

## 2025-02-17 DIAGNOSIS — R06.02 EXERTIONAL SHORTNESS OF BREATH: ICD-10-CM

## 2025-02-17 DIAGNOSIS — E78.5 HYPERLIPIDEMIA, UNSPECIFIED HYPERLIPIDEMIA TYPE: ICD-10-CM

## 2025-02-17 LAB — POC HEMOGLOBIN A1C: 6 % (ref 4.2–6.5)

## 2025-02-17 PROCEDURE — 99214 OFFICE O/P EST MOD 30 MIN: CPT | Performed by: FAMILY MEDICINE

## 2025-02-17 PROCEDURE — 83036 HEMOGLOBIN GLYCOSYLATED A1C: CPT | Performed by: FAMILY MEDICINE

## 2025-02-17 PROCEDURE — 1036F TOBACCO NON-USER: CPT | Performed by: FAMILY MEDICINE

## 2025-02-17 PROCEDURE — 1159F MED LIST DOCD IN RCRD: CPT | Performed by: FAMILY MEDICINE

## 2025-02-17 PROCEDURE — 3078F DIAST BP <80 MM HG: CPT | Performed by: FAMILY MEDICINE

## 2025-02-17 PROCEDURE — G2211 COMPLEX E/M VISIT ADD ON: HCPCS | Performed by: FAMILY MEDICINE

## 2025-02-17 PROCEDURE — 3074F SYST BP LT 130 MM HG: CPT | Performed by: FAMILY MEDICINE

## 2025-02-17 RX ORDER — ROSUVASTATIN CALCIUM 20 MG/1
20 TABLET, COATED ORAL DAILY
Qty: 90 TABLET | Refills: 3 | Status: SHIPPED | OUTPATIENT
Start: 2025-02-17 | End: 2026-02-17

## 2025-02-17 NOTE — PROGRESS NOTES
"Subjective   Casey Raya is a 69 y.o. male who presents for Follow-up (6 month follow up).   A1c is 6.0%    HPI  Last seen on 9/16/24    #) right great toe pain   - comes and goes   - never had uric acid   - does recall strain/injury of that toe in the past     1. Chronic neck and Back Pain-   - MRI of the L spine 3/31/22- no major changes to the lumbar spine, still have disease and would recommend seeing an orthopedic spine doc to review and discuss with your sympotms. L4/L5: Left hemilaminotomy (post surgery changes) . Decreased left paracentral protrusion and perineural fibrosis (this is a good thing) . L5/S1: Moderate left foraminal stenosis.  - tried an epidural nerve block which didn’t help   - previous s/p L4/L5 back surgery performed by Vignesh Kevin  - Dor[facility] rec trial of PT in April 2022  - increased gabapentin by 200 mg (1400 per day)      #) basal cell carcinoma of the right nose   - Mohs with Dr Jackson on 8/22/24   - would like referred to Dr Willard for full body check      #) trigger finger, - release by Dr Newton in 2022     #) T2DM with peripheral neuropathy    3 mo A1C follow up-- 6.3% today (up from 6.2%) --> 7.4% --> 6.1% --> 7.2% --> 6.2%-->  6.0% --> 6.0%   - started on the mounjaro 5.0 g weekly   - was on the ozempic with side effects of diarrhea and constipation   - on metformin 1000 mg BID - stopped now that A1c was good and trying to treat the diarrhea   - still some stomach symptoms, will monitor closely with increasing Iozomic dose   - eating small frequent meals, wife says he is getting protein  - staying well hydrated   - was on statin, and stopped because was worried it was making sugars are worse      #)  Pain Management- is better with less activity  - not following with them any longer   - Gabapentin - is helping, cymbalta  - wants some Vicodin for his back until he can see Dr. Kevin     #). Shoulder Pain, thinks it is from his neck- the same/ \"ok\" - both hurt - the same, " average pain 5/10  - Dr. High had an appointment and he said that there is nothing wrong with the shoulder structurally according to the patient     #) BPH with LUTS - follows with urology yearly.   - on gemtesa (stopped the myrbetric)   resolved s/p Urolift 2019 and PVP 11/08/2021   - could not tolerate the SGLT2 due to worsening urinary symptoms      ROS was completed and all systems are negative with the exception of what was noted in the the HPI.     Objective     /74   Pulse 80   Wt 79.8 kg (176 lb)   SpO2 97%   BMI 26.76 kg/m²      Physical Exam  GEN: A+O, no acute distress  HEENT: NC/AT, Oropharynx clear, no exudates, TM visualized, Extraoccular muscles intact, no facial droop; no thyromegaly or cervical LAD  RESP: CTAB, no wheezes   CV: RRR, no murmurs  ABD: soft, non-tender, + BS  SKIN: no rashes or bruising, no peripheral edema   NEURO: CN II-XII grossly intact, moves all extremities equally, no tremor   PSYCH: normal affect, appropriate mood     Assessment/Plan   Problem List Items Addressed This Visit       Diabetes mellitus (Multi) - Primary    Relevant Orders    POCT glycosylated hemoglobin (Hb A1C) manually resulted     A1c is so much better at 6.0%.     So glad you are liking the Mounjaro , continue the 5 mg weekly for now     We will order a uric acid next blood draw     Stop the simvastatin and start the rosuvastatin for the cholesterol     Order for a treadmill stress test     Continue to follow up with Yanet for follow up as needed     Colon screening in August 2, 2023. Repeat colonoscopy in 10 years for screening purposes.     Blood work in February 2025 was reviewed. Triglycerides are high at 267, goal is to get this less than 150. You are on simvastatin which is not the best option for statin therapy, I would recommend changing to rosuvastatin . Also try to avoid a high carbohydrate diet. A1c was good at 6% on 9/16/24, the fasting sugar was 134, goal is to get this under 120 .  Kidney function looks good, normal liver and thyroid levels. Vitamin D is 28 which is low so please start/restart vitamin D supplements. Normal blood counts, no anemia or infection. Pending prostate level     follow up with urologist as recommended     Continue the cholesterol medications.     Blood pressure looks good 122/74    Please get an up dated eye and dental examination   Also check feet regularly.     call if you need refills.     follow up in  September 2025 for medicare wellness or a repeat A1c check or sooner as needed.  Get updated fasting blood work prior to next appt.        Ruthie Renee, DO, MSMed, ABOM  [address]   [address]   [place] [address]  Ph. [phone]  Fx. [phone]

## 2025-02-17 NOTE — PATIENT INSTRUCTIONS
A1c is so much better at 6.0%.     So glad you are liking the Mounjaro , continue the 5 mg weekly for now     We will order a uric acid next blood draw     Stop the simvastatin and start the rosuvastatin for the cholesterol     Order for a treadmill stress test     Continue to follow up with Yanet for follow up as needed     Colon screening in August 2, 2023. Repeat colonoscopy in 10 years for screening purposes.     Blood work in February 2025 was reviewed. Triglycerides are high at 267, goal is to get this less than 150. You are on simvastatin which is not the best option for statin therapy, I would recommend changing to rosuvastatin . Also try to avoid a high carbohydrate diet. A1c was good at 6% on 9/16/24, the fasting sugar was 134, goal is to get this under 120 . Kidney function looks good, normal liver and thyroid levels. Vitamin D is 28 which is low so please start/restart vitamin D supplements. Normal blood counts, no anemia or infection. Pending prostate level     follow up with urologist as recommended     Continue the cholesterol medications.     Blood pressure looks good 122/74    Please get an up dated eye and dental examination   Also check feet regularly.     call if you need refills.     follow up in  September 2025 for medicare wellness or a repeat A1c check or sooner as needed.  Get updated fasting blood work prior to next appt.

## 2025-02-27 NOTE — PROGRESS NOTES
Subjective   Casey Raya is a 69 y.o. male with history of BPH s/p Urolift 2019 and PVP (11/2021) and post-op OAB on Gemtesa; presenting today for an annual follow up visit. Patient reports worsening urinary stream, sensation of incomplete bladder emptying, frequency and urgency. He states his insurance no longer covers Gemtesa.         Past Medical History:   Diagnosis Date    Discitis, unspecified, lumbar region     Lumbar discitis    Personal history of other diseases of the respiratory system 03/20/2017    History of sinusitis    Personal history of other endocrine, nutritional and metabolic disease     History of diabetes mellitus     Past Surgical History:   Procedure Laterality Date    CERVICAL FUSION  01/08/2018    Cervical Vertebral Fusion     No family history on file.  Current Outpatient Medications   Medication Sig Dispense Refill    blood sugar diagnostic (Contour Test Strips) strip Apperian Contour Test STRP   Quantity: 100  Refills: 0        Start : 25-Sep-2013   Active (Patient not taking: Reported on 2/17/2025)      blood-glucose sensor (FreeStyle Brandon 3 Plus Sensor) device Change sensor every 15 days 6 each 3    DULoxetine (Cymbalta) 60 mg DR capsule Take 1 capsule (60 mg) by mouth once daily.      gabapentin (Neurontin) 600 mg tablet Take 1 tablet (600 mg) by mouth 3 times a day. 270 tablet 3    LORazepam (Ativan) 0.5 mg tablet TAKE 1 TABELT BY MOUTH PER DAY, AS NEEDED      NAPROXEN ORAL Take by mouth.      omeprazole (PriLOSEC) 10 mg DR capsule Take by mouth once daily in the morning. Take before meals.      rosuvastatin (Crestor) 20 mg tablet Take 1 tablet (20 mg) by mouth once daily. 90 tablet 3    simvastatin (Zocor) 10 mg tablet TAKE 1 TABLET BY MOUTH EVERY DAY 90 tablet 3    tadalafil 20 mg tablet TAKE 1 TABLET BY MOUTH 1 hour before activity AS NEEDED      tirzepatide (Mounjaro) 5 mg/0.5 mL pen injector Inject 5 mg under the skin 1 (one) time per week. 6 mL 1    ubidecarenone (COENZYME  Q10, BULK, MISC) Take 100 mg by mouth.       No current facility-administered medications for this visit.     Allergies   Allergen Reactions    Bupropion Unknown    Cat Dander Unknown    Dog Dander Unknown    Oxycodone-Acetaminophen Itching    Prednisone Unknown     Social History     Socioeconomic History    Marital status:      Spouse name: Not on file    Number of children: Not on file    Years of education: Not on file    Highest education level: Not on file   Occupational History    Not on file   Tobacco Use    Smoking status: Never    Smokeless tobacco: Never   Vaping Use    Vaping status: Never Used   Substance and Sexual Activity    Alcohol use: Never    Drug use: Never    Sexual activity: Not on file   Other Topics Concern    Not on file   Social History Narrative    Not on file     Social Drivers of Health     Financial Resource Strain: Not on file   Food Insecurity: Not on file   Transportation Needs: Not on file   Physical Activity: Not on file   Stress: Not on file   Social Connections: Not on file   Intimate Partner Violence: Not on file   Housing Stability: Not on file       Review of Systems  Pertinent items are noted in HPI.    Objective       Lab Review  Lab Results   Component Value Date    WBC 5.3 02/03/2025    RBC 4.70 02/03/2025    HGB 14.4 02/03/2025    HCT 44.6 02/03/2025     02/03/2025      Lab Results   Component Value Date    BUN 19 02/03/2025    CREATININE 0.56 (L) 02/03/2025        Lab Results   Component Value Date    PSA 0.5 02/03/2025    PSA 0.50 07/01/2020    PSA 0.68 07/10/2019     IPSS 10 and 3  PVR 65 ml  Uroflow study demonstrated voided volume of 57 and maximal flow rate of 17 ml/s.         Assessment/Plan   Diagnoses and all orders for this visit:  Benign prostatic hyperplasia with urinary frequency  -     Measure post void residual  -     Urine flow measurement  OAB (overactive bladder)      BPH s/p Urolift 2019 and PVP (11/2021)   OAB    PSA is 0.5 on 2/3/2025,  stable.       We will proceed with cystoscopy to r/o a [facility].    The risks of cystoscopy were discussed with the patient in great detail, including risk of hematuria, UTI and discomfort. Antibiotic will be prescribed to be taken 1 hour prior to the procedure. Patient agrees to proceed.       Patient's insurance no longer covers Gemtesa. We will start him on Vesicare.     Side effects of anticholinergic medications was reviewed in great detail, including dry mouth, constipation, blurred vision, dyspepsia, dizziness, drowsiness and headaches. Patient voiced understadning and wishes to proceed.       All questions were answered to the patient's satisfaction. Patient agrees with the plan and wishes to proceed. Follow-up will be scheduled appropriately.     E&M visit today is associated with current or anticipated ongoing medical care services related to a patient's single, serious condition or a complex condition.    Scribed for Dr. Hinojosa by Devika Whitfield. I , Dr Hinojosa, have personally reviewed and agreed with the information entered by the Virtual Scribe.

## 2025-03-03 ENCOUNTER — APPOINTMENT (OUTPATIENT)
Dept: UROLOGY | Facility: CLINIC | Age: 70
End: 2025-03-03
Payer: COMMERCIAL

## 2025-03-03 VITALS — TEMPERATURE: 97 F

## 2025-03-03 DIAGNOSIS — N40.1 BENIGN PROSTATIC HYPERPLASIA WITH URINARY FREQUENCY: Primary | ICD-10-CM

## 2025-03-03 DIAGNOSIS — N32.81 OAB (OVERACTIVE BLADDER): ICD-10-CM

## 2025-03-03 DIAGNOSIS — Z79.2 NEED FOR PROPHYLACTIC ANTIBIOTIC: ICD-10-CM

## 2025-03-03 DIAGNOSIS — R35.0 BENIGN PROSTATIC HYPERPLASIA WITH URINARY FREQUENCY: Primary | ICD-10-CM

## 2025-03-03 PROCEDURE — 51741 ELECTRO-UROFLOWMETRY FIRST: CPT | Performed by: UROLOGY

## 2025-03-03 PROCEDURE — 1159F MED LIST DOCD IN RCRD: CPT | Performed by: UROLOGY

## 2025-03-03 PROCEDURE — G2211 COMPLEX E/M VISIT ADD ON: HCPCS | Performed by: UROLOGY

## 2025-03-03 PROCEDURE — 1036F TOBACCO NON-USER: CPT | Performed by: UROLOGY

## 2025-03-03 PROCEDURE — 1126F AMNT PAIN NOTED NONE PRSNT: CPT | Performed by: UROLOGY

## 2025-03-03 PROCEDURE — 51798 US URINE CAPACITY MEASURE: CPT | Performed by: UROLOGY

## 2025-03-03 PROCEDURE — 99214 OFFICE O/P EST MOD 30 MIN: CPT | Performed by: UROLOGY

## 2025-03-03 RX ORDER — SOLIFENACIN SUCCINATE 5 MG/1
5 TABLET, FILM COATED ORAL DAILY
Qty: 30 TABLET | Refills: 11 | Status: SHIPPED | OUTPATIENT
Start: 2025-03-03 | End: 2026-03-03

## 2025-03-03 RX ORDER — CEPHALEXIN 500 MG/1
500 CAPSULE ORAL ONCE
Qty: 1 CAPSULE | Refills: 0 | Status: SHIPPED | OUTPATIENT
Start: 2025-03-03 | End: 2025-03-03

## 2025-03-03 ASSESSMENT — PAIN SCALES - GENERAL: PAINLEVEL_OUTOF10: 0-NO PAIN

## 2025-03-03 NOTE — PATIENT INSTRUCTIONS
Provider discussed disease process, treatment plan, medications,and discharge instructions. Family agrees with plan. Any questions were answered. Asthma Action Plan discussed by [facility] RT. Take antibiotic 1 hour prior to cystoscopy.  We will need a urine sample during that appointment, so please arrive with a full enough bladder to leave a sample.  There are no restrictions in medications, eating/drinking, and driving.

## 2025-03-07 ENCOUNTER — APPOINTMENT (OUTPATIENT)
Dept: CARDIOLOGY | Facility: CLINIC | Age: 70
End: 2025-03-07
Payer: COMMERCIAL

## 2025-03-18 ENCOUNTER — APPOINTMENT (OUTPATIENT)
Dept: UROLOGY | Facility: CLINIC | Age: 70
End: 2025-03-18
Payer: COMMERCIAL

## 2025-04-04 ENCOUNTER — HOSPITAL ENCOUNTER (OUTPATIENT)
Dept: CARDIOLOGY | Facility: CLINIC | Age: 70
Discharge: HOME | End: 2025-04-04
Payer: COMMERCIAL

## 2025-04-04 DIAGNOSIS — R06.02 EXERTIONAL SHORTNESS OF BREATH: ICD-10-CM

## 2025-04-04 PROCEDURE — 93350 STRESS TTE ONLY: CPT | Performed by: INTERNAL MEDICINE

## 2025-04-04 PROCEDURE — 93321 DOPPLER ECHO F-UP/LMTD STD: CPT | Performed by: INTERNAL MEDICINE

## 2025-04-04 PROCEDURE — 93016 CV STRESS TEST SUPVJ ONLY: CPT | Performed by: INTERNAL MEDICINE

## 2025-04-04 PROCEDURE — 93017 CV STRESS TEST TRACING ONLY: CPT

## 2025-04-04 PROCEDURE — 93018 CV STRESS TEST I&R ONLY: CPT | Performed by: INTERNAL MEDICINE

## 2025-05-19 ENCOUNTER — APPOINTMENT (OUTPATIENT)
Dept: DERMATOLOGY | Facility: CLINIC | Age: 70
End: 2025-05-19
Payer: COMMERCIAL

## 2025-05-19 DIAGNOSIS — D22.9 BENIGN NEVUS: ICD-10-CM

## 2025-05-19 DIAGNOSIS — L81.4 LENTIGO: ICD-10-CM

## 2025-05-19 DIAGNOSIS — L82.1 SEBORRHEIC KERATOSIS: ICD-10-CM

## 2025-05-19 DIAGNOSIS — D18.01 ANGIOMA OF SKIN: ICD-10-CM

## 2025-05-19 DIAGNOSIS — L90.5 SCAR CONDITIONS AND FIBROSIS OF SKIN: Primary | ICD-10-CM

## 2025-05-19 DIAGNOSIS — Z85.828 HISTORY OF NONMELANOMA SKIN CANCER: ICD-10-CM

## 2025-05-19 DIAGNOSIS — L57.0 ACTINIC KERATOSIS: ICD-10-CM

## 2025-05-19 DIAGNOSIS — L57.9 SKIN CHANGES DUE TO CHRONIC EXPOSURE TO NONIONIZING RADIATION: ICD-10-CM

## 2025-05-19 ASSESSMENT — DERMATOLOGY QUALITY OF LIFE (QOL) ASSESSMENT
RATE HOW BOTHERED YOU ARE BY EFFECTS OF YOUR SKIN PROBLEMS ON YOUR ACTIVITIES (EG, GOING OUT, ACCOMPLISHING WHAT YOU WANT, WORK ACTIVITIES OR YOUR RELATIONSHIPS WITH OTHERS): 1
RATE HOW BOTHERED YOU ARE BY SYMPTOMS OF YOUR SKIN PROBLEM (EG, ITCHING, STINGING BURNING, HURTING OR SKIN IRRITATION): 1
RATE HOW BOTHERED YOU ARE BY EFFECTS OF YOUR SKIN PROBLEMS ON YOUR ACTIVITIES (EG, GOING OUT, ACCOMPLISHING WHAT YOU WANT, WORK ACTIVITIES OR YOUR RELATIONSHIPS WITH OTHERS): 1
RATE HOW BOTHERED YOU ARE BY SYMPTOMS OF YOUR SKIN PROBLEM (EG, ITCHING, STINGING BURNING, HURTING OR SKIN IRRITATION): 1
RATE HOW EMOTIONALLY BOTHERED YOU ARE BY YOUR SKIN PROBLEM (FOR EXAMPLE, WORRY, EMBARRASSMENT, FRUSTRATION): 0 - NEVER BOTHERED
WHAT SINGLE SKIN CONDITION LISTED BELOW IS THE PATIENT ANSWERING THE QUALITY-OF-LIFE ASSESSMENT QUESTIONS ABOUT: NONE OF THE ABOVE
WHAT SINGLE SKIN CONDITION LISTED BELOW IS THE PATIENT ANSWERING THE QUALITY-OF-LIFE ASSESSMENT QUESTIONS ABOUT: NONE OF THE ABOVE
DATE THE QUALITY-OF-LIFE ASSESSMENT WAS COMPLETED: 67344
RATE HOW EMOTIONALLY BOTHERED YOU ARE BY YOUR SKIN PROBLEM (FOR EXAMPLE, WORRY, EMBARRASSMENT, FRUSTRATION): 0 - NEVER BOTHERED

## 2025-05-19 NOTE — PATIENT INSTRUCTIONS

## 2025-05-19 NOTE — PROGRESS NOTES
Subjective     Casey Raya is a 70 y.o. male who presents for the following: Skin Check.     Review of Systems:  No other skin or systemic complaints other than what is documented elsewhere in the note.    The following portions of the chart were reviewed this encounter and updated as appropriate:   Tobacco  Allergies  Meds  Problems  Med Hx  Surg Hx         Skin Cancer History  Biopsy Log Book  Biopsied Type Location Status   7/3/24 BCC Right Nasal Sidewall Treatment Complete  8/22/24       Additional History      Specialty Problems          Dermatology Problems    Lipoma of skin and subcutaneous tissue        Objective   Well appearing patient in no apparent distress; mood and affect are within normal limits.    A focused skin examination was performed waist up. All findings within normal limits unless otherwise noted below.    Assessment/Plan   Skin Exam  1. SCAR CONDITIONS AND FIBROSIS OF SKIN  Right Nasal Sidewall  Well healed scar at the site(s) of prior treatment with no evidence of recurrence.        The scar is clear, there is no evidence of recurrence.  The present appearance of the scar is not worrisome but it should continue to be observed and testing/treatment may be warranted if change occurs.    Related Procedures  Follow Up In Dermatology - Established Patient  2. ACTINIC KERATOSIS (2)  Left Forehead, Mid Tip of Nose  Thin erythematous papules with gritty scale  WHAT IS ACTINIC KERATOSIS?   - Actinic keratosis (AK) is a skin condition caused by sun damage. It causes scaly, rough, or bumpy spots on the skin.  - If left alone, AKs may turn into a skin cancer. People who burn easily or have trouble tanning are at more risk for developing AKs.   - There is no one test for AKs and diagnosis is made by clinical appearance. Treatment options include cryotherapy, therapy with lights, and various creams (e.g., topical 5-fluorocuracil, imiquimod).       To lower the chance of getting AK, you can:        ?  Stay out of the sun in the middle of the day (from 10 a.m. to 4 p.m.)       ?  Wear sunscreen - An SPF of at least 30 is best. The SPF number is on the sunscreen bottle or tube.       ?  Wear a wide-brimmed hat, long-sleeved shirt, long pants, or long skirt outside. A baseball hat does not give much protection.        ?  Do not use tanning beds.        ?  Keep a low-fat diet, less than 21% of calories should come from fat       ?  Take Vitamin B3 (nicotinomide) 500mg twice daily.      YOUR TREATMENT PLAN  - At this time I recommend treatment with cryotherapy.  - Possible side effects of liquid nitrogen treatment reviewed including formation of blisters, crusting, tenderness, scar, and discoloration which may be permanent.  - Patient advised to return the office for re-evaluation if the treated lesion(s) do not resolve within 4-6 weeks. Patient verbalizes understanding.  Destr of lesion - Left Forehead, Mid Tip of Nose  Complexity: simple    Destruction method: cryotherapy    Informed consent: discussed and consent obtained    Timeout:  patient name, date of birth, surgical site, and procedure verified  Lesion destroyed using liquid nitrogen: Yes    Cryotherapy cycles:  2  Outcome: patient tolerated procedure well with no complications    Post-procedure details: wound care instructions given    Related Procedures  Follow Up In Dermatology - Established Patient  3. ANGIOMA OF SKIN  Generalized  Scattered cherry-red papule(s).  A cherry hemangioma is a small macule (small, flat, smooth area) or papule (small, solid bump) formed from an overgrowth of tiny blood vessels in the skin. Cherry hemangiomas are characteristically red or purplish in color. They often first appear in middle adulthood and usually increase in number with age. Cherry hemangiomas are noncancerous (benign) and are common in adults.    The present appearance of the lesion is not worrisome but it should continue to be observed and  testing/treatment may be warranted if change occurs.  Related Procedures  Follow Up In Dermatology - Established Patient  4. BENIGN NEVUS  Generalized  Scattered, uniform and benign-appearing, regular brown melanocytic papules and macules.  The present appearance of the lesion is not worrisome but it should continue to be observed and testing/treatment may be warranted if change occurs.  Related Procedures  Follow Up In Dermatology - Established Patient  5. SEBORRHEIC KERATOSIS  Generalized  Stuck on verrucous, tan-brown papules and plaques.    Seborrheic keratoses are common noncancerous (benign) growths of unknown cause seen in adults due to a thickening of an area of the top skin layer. Seborrheic keratoses may appear as if they are stuck on to the skin. They have distinct borders, and they may appear as papules (small, solid bumps) or plaques (solid, raised patches that are bigger than a thumbnail). They may be the same color as your skin, or they may be pink, light brown, darker brown, or very dark brown, or sometimes may appear black.    There is no way to prevent new seborrheic keratoses from forming. Seborrheic keratoses can be removed, but removal is considered a cosmetic issue and is usually not covered by insurance.    PLAN  No treatment is needed unless there is irritation from clothing, such as itching or bleeding.  2.   Some lotions containing alpha hydroxy acids, salicylic acid, or urea may make the areas feel smoother with regular use but will not eliminate them.  Related Procedures  Follow Up In Dermatology - Established Patient  6. LENTIGO  Generalized  Scattered tan macules in sun-exposed areas.  A solar lentigo (plural, solar lentigines), also known as a sun-induced freckle or senile lentigo, is a dark (hyperpigmented) lesion caused by natural or artificial ultraviolet (UV) light. Solar lentigines may be single or multiple. This type of lentigo is different from a simple lentigo (lentigo simplex)  because it is caused by exposure to UV light. Solar lentigines are benign, but they do indicate excessive sun exposure, a risk factor for the development of skin cancer.    To prevent solar lentigines, avoid exposure to sunlight in midday (10 AM to 3 PM), wear sun-protective clothing (tightly woven clothes and hats), and apply sunscreen (SPF 30 UVA and UVB block).    The present appearance of the lesion is not worrisome but it should continue to be observed and testing/treatment may be warranted if change occurs.  Related Procedures  Follow Up In Dermatology - Established Patient  7. HISTORY OF NONMELANOMA SKIN CANCER  Generalized  ABCDEs of melanoma and atypical moles were discussed with the patient.    Patient was instructed to perform monthly self skin examination.  We recommended that the patient have regular full skin exams given an increased risk of subsequent skin cancers.    The patient was instructed to use sun protective behaviors including use of broad spectrum sunscreens and sun protective clothing to reduce risk of skin cancers.    Warning signs of non-melanoma skin cancer discussed.  Related Procedures  Follow Up In Dermatology - Established Patient  8. SKIN CHANGES DUE TO CHRONIC EXPOSURE TO NONIONIZING RADIATION  Generalized  Actinic changes in the form of freckles, lentigines and hyper/hypopigmentation   ABCDEs of melanoma and atypical moles were discussed with the patient.    Patient was instructed to perform monthly self skin examination.  We recommended that the patient have regular full skin exams given an increased risk of subsequent skin cancers.    The patient was instructed to use sun protective behaviors including use of broad spectrum sunscreens and sun protective clothing to reduce risk of skin cancers.    Warning signs of non-melanoma skin cancer discussed.  Related Procedures  Follow Up In Dermatology - Established Patient    Return in 6 months for routine skin check or return to clinic  sooner if needed

## 2025-07-24 DIAGNOSIS — E08.00 DIABETES MELLITUS DUE TO UNDERLYING CONDITION WITH HYPEROSMOLARITY WITHOUT COMA, WITHOUT LONG-TERM CURRENT USE OF INSULIN: ICD-10-CM

## 2025-07-25 RX ORDER — TIRZEPATIDE 5 MG/.5ML
5 INJECTION, SOLUTION SUBCUTANEOUS WEEKLY
Qty: 6 ML | Refills: 1 | Status: SHIPPED | OUTPATIENT
Start: 2025-07-25

## 2025-09-03 LAB
25(OH)D3+25(OH)D2 SERPL-MCNC: 63 NG/ML (ref 30–100)
ALBUMIN SERPL-MCNC: 4.4 G/DL (ref 3.6–5.1)
ALBUMIN/GLOB SERPL: 1.8 (CALC) (ref 1–2.5)
ALP SERPL-CCNC: 56 U/L (ref 35–144)
ALT SERPL-CCNC: 23 U/L (ref 9–46)
AST SERPL-CCNC: 20 U/L (ref 10–35)
BASOPHILS # BLD AUTO: 50 CELLS/UL (ref 0–200)
BASOPHILS NFR BLD AUTO: 1 %
BILIRUB SERPL-MCNC: 0.4 MG/DL (ref 0.2–1.2)
BUN SERPL-MCNC: 17 MG/DL (ref 7–25)
BUN/CREAT SERPL: 27 (CALC) (ref 6–22)
CALCIUM SERPL-MCNC: 9.1 MG/DL (ref 8.6–10.3)
CHLORIDE SERPL-SCNC: 106 MMOL/L (ref 98–110)
CHOLEST SERPL-MCNC: 192 MG/DL
CHOLEST/HDLC SERPL: 5.6 (CALC)
CO2 SERPL-SCNC: 25 MMOL/L (ref 20–32)
CREAT SERPL-MCNC: 0.63 MG/DL (ref 0.7–1.35)
EGFRCR SERPLBLD CKD-EPI 2021: 103 ML/MIN/1.73M2
EOSINOPHIL # BLD AUTO: 300 CELLS/UL (ref 15–500)
EOSINOPHIL NFR BLD AUTO: 6 %
ERYTHROCYTE [DISTWIDTH] IN BLOOD BY AUTOMATED COUNT: 12 % (ref 11–15)
GLOBULIN SER CALC-MCNC: 2.5 G/DL (CALC) (ref 1.9–3.7)
GLUCOSE SERPL-MCNC: 141 MG/DL (ref 65–99)
HCT VFR BLD AUTO: 44.2 % (ref 38.5–50)
HDLC SERPL-MCNC: 34 MG/DL
HGB BLD-MCNC: 14.3 G/DL (ref 13.2–17.1)
LDLC SERPL CALC-MCNC: 115 MG/DL (CALC)
LYMPHOCYTES # BLD AUTO: 2245 CELLS/UL (ref 850–3900)
LYMPHOCYTES NFR BLD AUTO: 44.9 %
MCH RBC QN AUTO: 30.4 PG (ref 27–33)
MCHC RBC AUTO-ENTMCNC: 32.4 G/DL (ref 32–36)
MCV RBC AUTO: 94 FL (ref 80–100)
MONOCYTES # BLD AUTO: 460 CELLS/UL (ref 200–950)
MONOCYTES NFR BLD AUTO: 9.2 %
NEUTROPHILS # BLD AUTO: 1945 CELLS/UL (ref 1500–7800)
NEUTROPHILS NFR BLD AUTO: 38.9 %
NONHDLC SERPL-MCNC: 158 MG/DL (CALC)
PLATELET # BLD AUTO: 251 THOUSAND/UL (ref 140–400)
PMV BLD REES-ECKER: 10.1 FL (ref 7.5–12.5)
POTASSIUM SERPL-SCNC: 4.2 MMOL/L (ref 3.5–5.3)
PROT SERPL-MCNC: 6.9 G/DL (ref 6.1–8.1)
PSA FREE MFR SERPL: 40 % (CALC)
PSA FREE SERPL-MCNC: 0.2 NG/ML
PSA SERPL-MCNC: 0.5 NG/ML
RBC # BLD AUTO: 4.7 MILLION/UL (ref 4.2–5.8)
SODIUM SERPL-SCNC: 141 MMOL/L (ref 135–146)
TRIGL SERPL-MCNC: 320 MG/DL
TSH SERPL-ACNC: 2.86 MIU/L (ref 0.4–4.5)
WBC # BLD AUTO: 5 THOUSAND/UL (ref 3.8–10.8)

## 2025-09-08 ENCOUNTER — APPOINTMENT (OUTPATIENT)
Dept: PRIMARY CARE | Facility: CLINIC | Age: 70
End: 2025-09-08
Payer: COMMERCIAL

## 2025-11-19 ENCOUNTER — APPOINTMENT (OUTPATIENT)
Dept: DERMATOLOGY | Facility: CLINIC | Age: 70
End: 2025-11-19
Payer: COMMERCIAL